# Patient Record
Sex: MALE | Race: WHITE | NOT HISPANIC OR LATINO | URBAN - METROPOLITAN AREA
[De-identification: names, ages, dates, MRNs, and addresses within clinical notes are randomized per-mention and may not be internally consistent; named-entity substitution may affect disease eponyms.]

---

## 2018-08-01 VITALS
WEIGHT: 242.07 LBS | TEMPERATURE: 100 F | RESPIRATION RATE: 18 BRPM | SYSTOLIC BLOOD PRESSURE: 124 MMHG | OXYGEN SATURATION: 97 % | HEART RATE: 100 BPM | DIASTOLIC BLOOD PRESSURE: 72 MMHG | HEIGHT: 66 IN

## 2018-08-01 LAB
ALBUMIN SERPL ELPH-MCNC: 4.7 G/DL — SIGNIFICANT CHANGE UP (ref 3.3–5)
ALP SERPL-CCNC: 67 U/L — SIGNIFICANT CHANGE UP (ref 40–120)
ALT FLD-CCNC: 37 U/L — SIGNIFICANT CHANGE UP (ref 10–45)
ANION GAP SERPL CALC-SCNC: 18 MMOL/L — HIGH (ref 5–17)
APTT BLD: 27.8 SEC — SIGNIFICANT CHANGE UP (ref 27.5–37.4)
AST SERPL-CCNC: 27 U/L — SIGNIFICANT CHANGE UP (ref 10–40)
BASE EXCESS BLDV CALC-SCNC: -1.2 MMOL/L — SIGNIFICANT CHANGE UP
BASOPHILS NFR BLD AUTO: 0.1 % — SIGNIFICANT CHANGE UP (ref 0–2)
BILIRUB SERPL-MCNC: 0.8 MG/DL — SIGNIFICANT CHANGE UP (ref 0.2–1.2)
BUN SERPL-MCNC: 20 MG/DL — SIGNIFICANT CHANGE UP (ref 7–23)
CA-I SERPL-SCNC: 1.19 MMOL/L — SIGNIFICANT CHANGE UP (ref 1.12–1.3)
CALCIUM SERPL-MCNC: 9.8 MG/DL — SIGNIFICANT CHANGE UP (ref 8.4–10.5)
CHLORIDE SERPL-SCNC: 103 MMOL/L — SIGNIFICANT CHANGE UP (ref 96–108)
CK MB CFR SERPL CALC: 6 NG/ML — SIGNIFICANT CHANGE UP (ref 0–6.7)
CK SERPL-CCNC: 170 U/L — SIGNIFICANT CHANGE UP (ref 30–200)
CO2 SERPL-SCNC: 21 MMOL/L — LOW (ref 22–31)
CREAT SERPL-MCNC: 1.07 MG/DL — SIGNIFICANT CHANGE UP (ref 0.5–1.3)
EOSINOPHIL NFR BLD AUTO: 0.4 % — SIGNIFICANT CHANGE UP (ref 0–6)
ETHANOL SERPL-MCNC: <10 MG/DL — SIGNIFICANT CHANGE UP (ref 0–10)
GAS PNL BLDV: 138 MMOL/L — SIGNIFICANT CHANGE UP (ref 138–146)
GAS PNL BLDV: SIGNIFICANT CHANGE UP
GAS PNL BLDV: SIGNIFICANT CHANGE UP
GLUCOSE SERPL-MCNC: 126 MG/DL — HIGH (ref 70–99)
HCO3 BLDV-SCNC: 23 MMOL/L — SIGNIFICANT CHANGE UP (ref 20–27)
HCT VFR BLD CALC: 43 % — SIGNIFICANT CHANGE UP (ref 39–50)
HGB BLD-MCNC: 14.9 G/DL — SIGNIFICANT CHANGE UP (ref 13–17)
INR BLD: 1.01 — SIGNIFICANT CHANGE UP (ref 0.88–1.16)
LACTATE SERPL-SCNC: 2.3 MMOL/L — HIGH (ref 0.5–2)
LYMPHOCYTES # BLD AUTO: 5 % — LOW (ref 13–44)
MCHC RBC-ENTMCNC: 31 PG — SIGNIFICANT CHANGE UP (ref 27–34)
MCHC RBC-ENTMCNC: 34.7 G/DL — SIGNIFICANT CHANGE UP (ref 32–36)
MCV RBC AUTO: 89.6 FL — SIGNIFICANT CHANGE UP (ref 80–100)
MONOCYTES NFR BLD AUTO: 6.6 % — SIGNIFICANT CHANGE UP (ref 2–14)
NEUTROPHILS NFR BLD AUTO: 87.9 % — HIGH (ref 43–77)
PCO2 BLDV: 39 MMHG — LOW (ref 41–51)
PH BLDV: 7.39 — SIGNIFICANT CHANGE UP (ref 7.32–7.43)
PLATELET # BLD AUTO: 236 K/UL — SIGNIFICANT CHANGE UP (ref 150–400)
PO2 BLDV: 42 MMHG — SIGNIFICANT CHANGE UP
POTASSIUM BLDV-SCNC: 4.2 MMOL/L — SIGNIFICANT CHANGE UP (ref 3.5–4.9)
POTASSIUM SERPL-MCNC: 4.4 MMOL/L — SIGNIFICANT CHANGE UP (ref 3.5–5.3)
POTASSIUM SERPL-SCNC: 4.4 MMOL/L — SIGNIFICANT CHANGE UP (ref 3.5–5.3)
PROT SERPL-MCNC: 7.2 G/DL — SIGNIFICANT CHANGE UP (ref 6–8.3)
PROTHROM AB SERPL-ACNC: 11.2 SEC — SIGNIFICANT CHANGE UP (ref 9.8–12.7)
RBC # BLD: 4.8 M/UL — SIGNIFICANT CHANGE UP (ref 4.2–5.8)
RBC # FLD: 12.3 % — SIGNIFICANT CHANGE UP (ref 10.3–16.9)
SAO2 % BLDV: 68 % — SIGNIFICANT CHANGE UP
SODIUM SERPL-SCNC: 142 MMOL/L — SIGNIFICANT CHANGE UP (ref 135–145)
TROPONIN T SERPL-MCNC: <0.01 NG/ML — SIGNIFICANT CHANGE UP (ref 0–0.01)
WBC # BLD: 22.7 K/UL — HIGH (ref 3.8–10.5)
WBC # FLD AUTO: 22.7 K/UL — HIGH (ref 3.8–10.5)

## 2018-08-01 PROCEDURE — 72147 MRI CHEST SPINE W/DYE: CPT | Mod: 26

## 2018-08-01 PROCEDURE — 72158 MRI LUMBAR SPINE W/O & W/DYE: CPT | Mod: 26

## 2018-08-01 RX ORDER — ACETAMINOPHEN 500 MG
650 TABLET ORAL ONCE
Qty: 0 | Refills: 0 | Status: COMPLETED | OUTPATIENT
Start: 2018-08-01 | End: 2018-08-01

## 2018-08-01 RX ORDER — HYDROMORPHONE HYDROCHLORIDE 2 MG/ML
1 INJECTION INTRAMUSCULAR; INTRAVENOUS; SUBCUTANEOUS ONCE
Qty: 0 | Refills: 0 | Status: DISCONTINUED | OUTPATIENT
Start: 2018-08-01 | End: 2018-08-01

## 2018-08-01 RX ORDER — SODIUM CHLORIDE 9 MG/ML
1000 INJECTION INTRAMUSCULAR; INTRAVENOUS; SUBCUTANEOUS ONCE
Qty: 0 | Refills: 0 | Status: COMPLETED | OUTPATIENT
Start: 2018-08-01 | End: 2018-08-01

## 2018-08-01 RX ORDER — PIPERACILLIN AND TAZOBACTAM 4; .5 G/20ML; G/20ML
3.38 INJECTION, POWDER, LYOPHILIZED, FOR SOLUTION INTRAVENOUS ONCE
Qty: 0 | Refills: 0 | Status: COMPLETED | OUTPATIENT
Start: 2018-08-01 | End: 2018-08-01

## 2018-08-01 RX ORDER — VANCOMYCIN HCL 1 G
1000 VIAL (EA) INTRAVENOUS ONCE
Qty: 0 | Refills: 0 | Status: COMPLETED | OUTPATIENT
Start: 2018-08-01 | End: 2018-08-01

## 2018-08-01 RX ADMIN — SODIUM CHLORIDE 1000 MILLILITER(S): 9 INJECTION INTRAMUSCULAR; INTRAVENOUS; SUBCUTANEOUS at 20:42

## 2018-08-01 RX ADMIN — Medication 250 MILLIGRAM(S): at 20:42

## 2018-08-01 RX ADMIN — Medication 650 MILLIGRAM(S): at 20:41

## 2018-08-01 RX ADMIN — PIPERACILLIN AND TAZOBACTAM 200 GRAM(S): 4; .5 INJECTION, POWDER, LYOPHILIZED, FOR SOLUTION INTRAVENOUS at 20:42

## 2018-08-01 RX ADMIN — HYDROMORPHONE HYDROCHLORIDE 1 MILLIGRAM(S): 2 INJECTION INTRAMUSCULAR; INTRAVENOUS; SUBCUTANEOUS at 20:42

## 2018-08-01 RX ADMIN — HYDROMORPHONE HYDROCHLORIDE 1 MILLIGRAM(S): 2 INJECTION INTRAMUSCULAR; INTRAVENOUS; SUBCUTANEOUS at 23:04

## 2018-08-01 RX ADMIN — PIPERACILLIN AND TAZOBACTAM 3.38 GRAM(S): 4; .5 INJECTION, POWDER, LYOPHILIZED, FOR SOLUTION INTRAVENOUS at 23:04

## 2018-08-01 NOTE — ED PROVIDER NOTE - OBJECTIVE STATEMENT
69 yo male with prior hx of ETOH abuse on naltrexone HLD HTN states he has had felt ill for 5-6 days - no recent travel - no sick contacts- dev fevers /chills last nite- no headache - no cough -no skin rashes  no tick bites - hasn't been [facility] or to the [facility] recently--states he was on the commode ? syncope ? head trauma developed right shoulder pain this related to possible syncopal event on tiolet  having mid to low back pain as well no incontinence of urine or stool no weakness of his legs or feet  c/o of pain with ambulation related to back pain- 67 yo male with prior hx of ETOH abuse on naltrexone HLD HTN states he has had felt ill for 5-6 days - no recent travel - no sick contacts- dev fevers /chills last nite- no headache or neck pain - severe low back pain lower to mid spine  - no cough -no skin rashes  no tick bites - hasn't been [facility] or to the [facility] recently--states he was on the commode ? syncope ? head trauma developed right shoulder pain this related to possible syncopal event on tiolet  having mid to low back pain as well no incontinence of urine or stool no weakness of his legs or feet  c/o of pain with ambulation related to back pain- 69 yo male with prior hx of ETOH abuse on naltrexone HLD HTN states he dev low back pain for 5-6 days - no recent travel - no sick contacts- dev fevers /chills last nite- no headache or neck pain - severe low back pain lower to mid spine  - no cough -no skin rashes  no tick bites - [place] or to the [facility] recently--states he was on the commode ? syncope ? head trauma developed right shoulder pain this related to possible syncopal event on tiolet  having mid to low back pain as well no incontinence of urine or stool no weakness of his legs or feet  c/o of pain with ambulation related to back pain-

## 2018-08-01 NOTE — ED ADULT TRIAGE NOTE - CHIEF COMPLAINT QUOTE
Patient c/o lower back pain radiating to both shoulders , left arm pain and left leg pain since last night , also had syncopal episode last night due to pain and dizziness . On aspirin daily . Denies any dizziness at this time .

## 2018-08-01 NOTE — ED ADULT NURSE NOTE - NSIMPLEMENTINTERV_GEN_ALL_ED
Implemented All Universal Safety Interventions:  [place] to call system. Call bell, personal items and telephone within reach. Instruct patient to call for assistance. Room bathroom lighting operational. Non-slip footwear when patient is off stretcher. Physically safe environment: no spills, clutter or unnecessary equipment. Stretcher in lowest position, wheels locked, appropriate side rails in place.

## 2018-08-01 NOTE — ED PROVIDER NOTE - SHIFT CHANGE DETAILS
if no epidural abscess on MR  then admit to cards for monitoring to eval for endocarditis in light of syncope

## 2018-08-01 NOTE — ED PROVIDER NOTE - CRANIAL NERVE AND PUPILLARY EXAM
corneal reflex intact/cranial nerves 2-12 intact/central and peripheral vision intact/gag reflex intact/tongue is midline/peripheral vision intact

## 2018-08-01 NOTE — ED PROVIDER NOTE - CARE PLAN
Principal Discharge DX:	Fever  Secondary Diagnosis:	Back pain Principal Discharge DX:	Fever  Secondary Diagnosis:	Back pain  Secondary Diagnosis:	Discitis

## 2018-08-01 NOTE — ED PROVIDER NOTE - MEDICAL DECISION MAKING DETAILS
69 yo male former drinker hx HTN HLD with fever  x 1 day 100.7 rectally  with low to mid back pain x 5 days  no trauma or falls no incontinence of urine or stool - no abd pain or flank pain no dysuria- c/o of right shoulder pain after fall off commode last nite WBC 22.7K  will MR LS and T spine to eval for SEA in light of fever neck supple on exam - clinical  ppt not c/w meningitis-  St on EKG  neg trop wuill require admission for iv antibiotics 69 yo male former drinker hx HTN HLD with fever  x 1 day 100.7 rectally  with low to mid back pain x 5 days  no trauma or falls no incontinence of urine or stool - no abd pain or flank pain no dysuria- c/o of right shoulder pain after fall off commode last nite WBC 22.7K  will MR LS and T spine to eval for SEA in light of fever neck supple on exam - clinical  ppt not c/w meningitis-  St on EKG  neg trop will require admission for cardiac monitoring ? endocarditis  dw  dr jhaveri

## 2018-08-02 ENCOUNTER — INPATIENT (INPATIENT)
Facility: HOSPITAL | Age: 68
LOS: 0 days | Discharge: HOME | DRG: 864 | End: 2018-08-03
Attending: INTERNAL MEDICINE | Admitting: INTERNAL MEDICINE
Payer: COMMERCIAL

## 2018-08-02 DIAGNOSIS — M46.47 DISCITIS, UNSPECIFIED, LUMBOSACRAL REGION: ICD-10-CM

## 2018-08-02 DIAGNOSIS — Z91.89 OTHER SPECIFIED PERSONAL RISK FACTORS, NOT ELSEWHERE CLASSIFIED: ICD-10-CM

## 2018-08-02 DIAGNOSIS — Z90.49 ACQUIRED ABSENCE OF OTHER SPECIFIED PARTS OF DIGESTIVE TRACT: Chronic | ICD-10-CM

## 2018-08-02 DIAGNOSIS — Z98.890 OTHER SPECIFIED POSTPROCEDURAL STATES: Chronic | ICD-10-CM

## 2018-08-02 DIAGNOSIS — Z90.89 ACQUIRED ABSENCE OF OTHER ORGANS: Chronic | ICD-10-CM

## 2018-08-02 DIAGNOSIS — Z29.9 ENCOUNTER FOR PROPHYLACTIC MEASURES, UNSPECIFIED: ICD-10-CM

## 2018-08-02 DIAGNOSIS — F10.10 ALCOHOL ABUSE, UNCOMPLICATED: ICD-10-CM

## 2018-08-02 DIAGNOSIS — I10 ESSENTIAL (PRIMARY) HYPERTENSION: ICD-10-CM

## 2018-08-02 DIAGNOSIS — A41.9 SEPSIS, UNSPECIFIED ORGANISM: ICD-10-CM

## 2018-08-02 LAB
ALBUMIN SERPL ELPH-MCNC: 4.3 G/DL — SIGNIFICANT CHANGE UP (ref 3.3–5)
ALP SERPL-CCNC: 58 U/L — SIGNIFICANT CHANGE UP (ref 40–120)
ALT FLD-CCNC: 30 U/L — SIGNIFICANT CHANGE UP (ref 10–45)
AMPHET UR-MCNC: NEGATIVE — SIGNIFICANT CHANGE UP
ANION GAP SERPL CALC-SCNC: 13 MMOL/L — SIGNIFICANT CHANGE UP (ref 5–17)
APPEARANCE UR: CLEAR — SIGNIFICANT CHANGE UP
AST SERPL-CCNC: 22 U/L — SIGNIFICANT CHANGE UP (ref 10–40)
BARBITURATES UR SCN-MCNC: NEGATIVE — SIGNIFICANT CHANGE UP
BASOPHILS NFR BLD AUTO: 0.1 % — SIGNIFICANT CHANGE UP (ref 0–2)
BENZODIAZ UR-MCNC: NEGATIVE — SIGNIFICANT CHANGE UP
BILIRUB SERPL-MCNC: 0.7 MG/DL — SIGNIFICANT CHANGE UP (ref 0.2–1.2)
BILIRUB UR-MCNC: NEGATIVE — SIGNIFICANT CHANGE UP
BUN SERPL-MCNC: 13 MG/DL — SIGNIFICANT CHANGE UP (ref 7–23)
CALCIUM SERPL-MCNC: 9.3 MG/DL — SIGNIFICANT CHANGE UP (ref 8.4–10.5)
CHLORIDE SERPL-SCNC: 99 MMOL/L — SIGNIFICANT CHANGE UP (ref 96–108)
CO2 SERPL-SCNC: 26 MMOL/L — SIGNIFICANT CHANGE UP (ref 22–31)
COCAINE METAB.OTHER UR-MCNC: NEGATIVE — SIGNIFICANT CHANGE UP
COLOR SPEC: YELLOW — SIGNIFICANT CHANGE UP
CREAT SERPL-MCNC: 1.15 MG/DL — SIGNIFICANT CHANGE UP (ref 0.5–1.3)
CRP SERPL-MCNC: 14.91 MG/DL — HIGH (ref 0–0.4)
DIFF PNL FLD: NEGATIVE — SIGNIFICANT CHANGE UP
EOSINOPHIL NFR BLD AUTO: 0.6 % — SIGNIFICANT CHANGE UP (ref 0–6)
ERYTHROCYTE [SEDIMENTATION RATE] IN BLOOD: 20 MM/HR — SIGNIFICANT CHANGE UP
GLUCOSE SERPL-MCNC: 127 MG/DL — HIGH (ref 70–99)
GLUCOSE UR QL: NEGATIVE — SIGNIFICANT CHANGE UP
HCT VFR BLD CALC: 39.9 % — SIGNIFICANT CHANGE UP (ref 39–50)
HGB BLD-MCNC: 13.2 G/DL — SIGNIFICANT CHANGE UP (ref 13–17)
KETONES UR-MCNC: NEGATIVE — SIGNIFICANT CHANGE UP
LEUKOCYTE ESTERASE UR-ACNC: NEGATIVE — SIGNIFICANT CHANGE UP
LYMPHOCYTES # BLD AUTO: 9.7 % — LOW (ref 13–44)
MCHC RBC-ENTMCNC: 30.8 PG — SIGNIFICANT CHANGE UP (ref 27–34)
MCHC RBC-ENTMCNC: 33.1 G/DL — SIGNIFICANT CHANGE UP (ref 32–36)
MCV RBC AUTO: 93.2 FL — SIGNIFICANT CHANGE UP (ref 80–100)
METHADONE UR-MCNC: NEGATIVE — SIGNIFICANT CHANGE UP
MONOCYTES NFR BLD AUTO: 10 % — SIGNIFICANT CHANGE UP (ref 2–14)
NEUTROPHILS NFR BLD AUTO: 79.6 % — HIGH (ref 43–77)
NITRITE UR-MCNC: NEGATIVE — SIGNIFICANT CHANGE UP
OPIATES UR-MCNC: NEGATIVE — SIGNIFICANT CHANGE UP
PCP SPEC-MCNC: SIGNIFICANT CHANGE UP
PCP UR-MCNC: NEGATIVE — SIGNIFICANT CHANGE UP
PH UR: 6 — SIGNIFICANT CHANGE UP (ref 5–8)
PLATELET # BLD AUTO: 211 K/UL — SIGNIFICANT CHANGE UP (ref 150–400)
POTASSIUM SERPL-MCNC: 4 MMOL/L — SIGNIFICANT CHANGE UP (ref 3.5–5.3)
POTASSIUM SERPL-SCNC: 4 MMOL/L — SIGNIFICANT CHANGE UP (ref 3.5–5.3)
PROT SERPL-MCNC: 6.8 G/DL — SIGNIFICANT CHANGE UP (ref 6–8.3)
PROT UR-MCNC: NEGATIVE MG/DL — SIGNIFICANT CHANGE UP
RBC # BLD: 4.28 M/UL — SIGNIFICANT CHANGE UP (ref 4.2–5.8)
RBC # FLD: 12.6 % — SIGNIFICANT CHANGE UP (ref 10.3–16.9)
SODIUM SERPL-SCNC: 138 MMOL/L — SIGNIFICANT CHANGE UP (ref 135–145)
SP GR SPEC: 1.01 — SIGNIFICANT CHANGE UP (ref 1–1.03)
THC UR QL: POSITIVE
UROBILINOGEN FLD QL: 0.2 E.U./DL — SIGNIFICANT CHANGE UP
WBC # BLD: 14 K/UL — HIGH (ref 3.8–10.5)
WBC # FLD AUTO: 14 K/UL — HIGH (ref 3.8–10.5)

## 2018-08-02 PROCEDURE — 99222 1ST HOSP IP/OBS MODERATE 55: CPT

## 2018-08-02 PROCEDURE — 70450 CT HEAD/BRAIN W/O DYE: CPT | Mod: 26

## 2018-08-02 PROCEDURE — 93306 TTE W/DOPPLER COMPLETE: CPT | Mod: 26

## 2018-08-02 PROCEDURE — 74174 CTA ABD&PLVS W/CONTRAST: CPT | Mod: 26

## 2018-08-02 PROCEDURE — 71045 X-RAY EXAM CHEST 1 VIEW: CPT | Mod: 26

## 2018-08-02 PROCEDURE — 93010 ELECTROCARDIOGRAM REPORT: CPT

## 2018-08-02 PROCEDURE — 72125 CT NECK SPINE W/O DYE: CPT | Mod: 26

## 2018-08-02 PROCEDURE — 71275 CT ANGIOGRAPHY CHEST: CPT | Mod: 26

## 2018-08-02 PROCEDURE — 99285 EMERGENCY DEPT VISIT HI MDM: CPT | Mod: 25

## 2018-08-02 PROCEDURE — 73030 X-RAY EXAM OF SHOULDER: CPT | Mod: 26,RT

## 2018-08-02 RX ORDER — LIDOCAINE 4 G/100G
2 CREAM TOPICAL ONCE
Qty: 0 | Refills: 0 | Status: COMPLETED | OUTPATIENT
Start: 2018-08-02 | End: 2018-08-02

## 2018-08-02 RX ORDER — ACETAMINOPHEN 500 MG
650 TABLET ORAL EVERY 6 HOURS
Qty: 0 | Refills: 0 | Status: DISCONTINUED | OUTPATIENT
Start: 2018-08-02 | End: 2018-08-02

## 2018-08-02 RX ORDER — LIDOCAINE 4 G/100G
1 CREAM TOPICAL DAILY
Qty: 0 | Refills: 0 | Status: DISCONTINUED | OUTPATIENT
Start: 2018-08-02 | End: 2018-08-03

## 2018-08-02 RX ORDER — VANCOMYCIN HCL 1 G
1500 VIAL (EA) INTRAVENOUS EVERY 12 HOURS
Qty: 0 | Refills: 0 | Status: DISCONTINUED | OUTPATIENT
Start: 2018-08-02 | End: 2018-08-02

## 2018-08-02 RX ORDER — HEPARIN SODIUM 5000 [USP'U]/ML
7500 INJECTION INTRAVENOUS; SUBCUTANEOUS EVERY 8 HOURS
Qty: 0 | Refills: 0 | Status: DISCONTINUED | OUTPATIENT
Start: 2018-08-02 | End: 2018-08-03

## 2018-08-02 RX ORDER — ONDANSETRON 8 MG/1
4 TABLET, FILM COATED ORAL ONCE
Qty: 0 | Refills: 0 | Status: COMPLETED | OUTPATIENT
Start: 2018-08-02 | End: 2018-08-02

## 2018-08-02 RX ORDER — MORPHINE SULFATE 50 MG/1
2 CAPSULE, EXTENDED RELEASE ORAL EVERY 4 HOURS
Qty: 0 | Refills: 0 | Status: DISCONTINUED | OUTPATIENT
Start: 2018-08-02 | End: 2018-08-03

## 2018-08-02 RX ORDER — CEFTRIAXONE 500 MG/1
2 INJECTION, POWDER, FOR SOLUTION INTRAMUSCULAR; INTRAVENOUS EVERY 24 HOURS
Qty: 0 | Refills: 0 | Status: DISCONTINUED | OUTPATIENT
Start: 2018-08-02 | End: 2018-08-02

## 2018-08-02 RX ORDER — HYDROMORPHONE HYDROCHLORIDE 2 MG/ML
1 INJECTION INTRAMUSCULAR; INTRAVENOUS; SUBCUTANEOUS ONCE
Qty: 0 | Refills: 0 | Status: DISCONTINUED | OUTPATIENT
Start: 2018-08-02 | End: 2018-08-02

## 2018-08-02 RX ORDER — ALPRAZOLAM 0.25 MG
1 TABLET ORAL ONCE
Qty: 0 | Refills: 0 | Status: DISCONTINUED | OUTPATIENT
Start: 2018-08-02 | End: 2018-08-02

## 2018-08-02 RX ORDER — ACETAMINOPHEN 500 MG
975 TABLET ORAL EVERY 6 HOURS
Qty: 0 | Refills: 0 | Status: DISCONTINUED | OUTPATIENT
Start: 2018-08-02 | End: 2018-08-03

## 2018-08-02 RX ORDER — HEPARIN SODIUM 5000 [USP'U]/ML
5000 INJECTION INTRAVENOUS; SUBCUTANEOUS EVERY 8 HOURS
Qty: 0 | Refills: 0 | Status: DISCONTINUED | OUTPATIENT
Start: 2018-08-02 | End: 2018-08-02

## 2018-08-02 RX ADMIN — ONDANSETRON 4 MILLIGRAM(S): 8 TABLET, FILM COATED ORAL at 01:21

## 2018-08-02 RX ADMIN — HYDROMORPHONE HYDROCHLORIDE 1 MILLIGRAM(S): 2 INJECTION INTRAMUSCULAR; INTRAVENOUS; SUBCUTANEOUS at 00:42

## 2018-08-02 RX ADMIN — LIDOCAINE 2 PATCH: 4 CREAM TOPICAL at 14:53

## 2018-08-02 RX ADMIN — CEFTRIAXONE 100 GRAM(S): 500 INJECTION, POWDER, FOR SOLUTION INTRAMUSCULAR; INTRAVENOUS at 06:40

## 2018-08-02 RX ADMIN — HYDROMORPHONE HYDROCHLORIDE 1 MILLIGRAM(S): 2 INJECTION INTRAMUSCULAR; INTRAVENOUS; SUBCUTANEOUS at 02:05

## 2018-08-02 RX ADMIN — HEPARIN SODIUM 7500 UNIT(S): 5000 INJECTION INTRAVENOUS; SUBCUTANEOUS at 22:39

## 2018-08-02 RX ADMIN — HYDROMORPHONE HYDROCHLORIDE 1 MILLIGRAM(S): 2 INJECTION INTRAMUSCULAR; INTRAVENOUS; SUBCUTANEOUS at 05:22

## 2018-08-02 RX ADMIN — LIDOCAINE 1 PATCH: 4 CREAM TOPICAL at 22:00

## 2018-08-02 RX ADMIN — HEPARIN SODIUM 7500 UNIT(S): 5000 INJECTION INTRAVENOUS; SUBCUTANEOUS at 14:51

## 2018-08-02 RX ADMIN — SODIUM CHLORIDE 1000 MILLILITER(S): 9 INJECTION INTRAMUSCULAR; INTRAVENOUS; SUBCUTANEOUS at 01:22

## 2018-08-02 RX ADMIN — Medication 975 MILLIGRAM(S): at 09:45

## 2018-08-02 RX ADMIN — Medication 975 MILLIGRAM(S): at 16:54

## 2018-08-02 RX ADMIN — Medication 300 MILLIGRAM(S): at 06:41

## 2018-08-02 RX ADMIN — MORPHINE SULFATE 2 MILLIGRAM(S): 50 CAPSULE, EXTENDED RELEASE ORAL at 19:44

## 2018-08-02 RX ADMIN — LIDOCAINE 1 PATCH: 4 CREAM TOPICAL at 10:22

## 2018-08-02 RX ADMIN — Medication 975 MILLIGRAM(S): at 18:11

## 2018-08-02 RX ADMIN — Medication 1000 MILLIGRAM(S): at 01:22

## 2018-08-02 RX ADMIN — Medication 50 MILLIGRAM(S): at 10:15

## 2018-08-02 RX ADMIN — Medication 1 MILLIGRAM(S): at 02:16

## 2018-08-02 RX ADMIN — LIDOCAINE 2 PATCH: 4 CREAM TOPICAL at 02:14

## 2018-08-02 RX ADMIN — Medication 975 MILLIGRAM(S): at 10:45

## 2018-08-02 RX ADMIN — Medication 50 MILLIGRAM(S): at 16:53

## 2018-08-02 NOTE — CONSULT NOTE ADULT - PROBLEM SELECTOR RECOMMENDATION 9
Biopsy by radiology  No antibiotics until after biopsy done  I have called radiology to discuss   Await return phone call

## 2018-08-02 NOTE — H&P ADULT - NSHPLABSRESULTS_GEN_ALL_CORE
.  LABS:                         14.9   22.7  )-----------( 236      ( 01 Aug 2018 19:37 )             43.0     08-01    142  |  103  |  20  ----------------------------<  126<H>  4.4   |  21<L>  |  1.07    Ca    9.8      01 Aug 2018 19:37    TPro  7.2  /  Alb  4.7  /  TBili  0.8  /  DBili  x   /  AST  27  /  ALT  37  /  AlkPhos  67  08-01    PT/INR - ( 01 Aug 2018 19:37 )   PT: 11.2 sec;   INR: 1.01          PTT - ( 01 Aug 2018 19:37 )  PTT:27.8 sec    CARDIAC MARKERS ( 01 Aug 2018 19:37 )  x     / <0.01 ng/mL / 170 U/L / x     / 6.0 ng/mL    Lactate, Blood: 2.3 mmoL/L (08-01 @ 19:37)    RADIOLOGY, EKG & ADDITIONAL TESTS: Reviewed.   CTH Noncon 8/2:  IMPRESSION:  No acute intracranial findings.  No evidence of cervical fracture or malalignment.  CT Angio chest/ abd/pelc 8/2:  No aortic dissection. Aortic branches are patent the main pulmonary   artery is dilated up to 3.7 cm consistent with some degree of pulmonary   arterial hypertension. Hepatic steatosis. Gallbladder, spleen, pancreas,   right adrenal gland and bilateral kidneys are unremarkable. Nonspecific   nodular thickening of the left adrenal gland. Urinary bladder grossly   unremarkable. No bowel obstruction, pericolic inflammatory change,   intra-abdominal ascites or pneumoperitoneum. Bibasilar atelectasis. No   consolidation, pleural effusion or pneumothorax. No mediastinal, axillary   or retroperitoneal adenopathy. Small right and moderate left   fat-containing inguinal hernia. Degenerative changes throughout the   spine.   MRI Lumbar/ thoracic 8/2:  1. 12 x 13 mm areas of enhancement seen within the superior L3 endplate   with small areas of  enhancement in superior and inferior L3, inferior L2 and superior L4   endplates. Osteomyelitis with  discitis should be considered in setting of workup for fever.  2. Areas of abnormal enhancement seen adjacent to L3 spinous process as   described above  measuring up to 2.5 x 2.0 cm likely representing myositis and cellulitis.

## 2018-08-02 NOTE — H&P ADULT - ASSESSMENT
A/p    67 yo M presenting to severe low back pain also one episode of syncope.  PMH of alcohol abuse in the past and HTN has chronic back pain but not at that extent. A/p    67 yo M presenting to severe low back pain also one episode of syncope.  PMH of alcohol abuse in the past/HTN/ hep C? Treatment has chronic back pain but not at that extent.

## 2018-08-02 NOTE — H&P ADULT - PROBLEM SELECTOR PLAN 2
pt reports one episode of syncope and LOC 2 night ago  denies any CP/SOB/ DIAL/ Palpitation/ no Hx of cardiac conditions/ no hx of seizure disorders  ECG shows sinus tachycardia with mild LAD otherwise unremarkable.  given episode happened when he was using the bathroom points toward a vasovagal event however needs further work up for syncope.  - Monitor for now  - repeat ECG

## 2018-08-02 NOTE — CONSULT NOTE ADULT - SUBJECTIVE AND OBJECTIVE BOX
HISTORY OF PRESENT ILLNESS:   67 y/o male with PMH HTN, HLD, depression, alcoholism presents to ED for low back pain x3 days. The pain was in the morning and got better throughout the day. Yesterday low back pain developed into generalized, whole body aches and pains in his whole back, shoulders and joints. His back pain radiates up his pain to his neck. He also endorses worsening neck pain with lateral movement of his neck. Patient also states he had a syncopal episode while in the bathroom 2 nights ago associated with subjective fever, chills and sweats x1 night. Patient denies weakness, trauma, incontinence, CP, SOB, N/V, IV drug use.     PAST MEDICAL & SURGICAL HISTORY:  Hepatitis C: s/p Treatment in the   Alcohol abuse  Hypertension  H/O arthroscopic knee surgery: Left meniscal tear  S/P appendectomy  S/P tonsillectomy    FAMILY HISTORY:  Family history of colon cancer (Father, Mother, Sibling)      SOCIAL HISTORY:  Tobacco Use:  EtOH use:   Substance:    Allergies    No Known Allergies    Intolerances        REVIEW OF SYSTEMS  AS mentioned in HPI    MEDICATIONS:  Antibiotics:    Neuro:  acetaminophen   Tablet. 975 milliGRAM(s) Oral every 6 hours PRN  pregabalin 50 milliGRAM(s) Oral three times a day    Anticoagulation:  heparin  Injectable 7500 Unit(s) SubCutaneous every 8 hours    OTHER:  lidocaine   Patch 1 Patch Transdermal daily    IVF:      Vital Signs Last 24 Hrs  T(C): 36.8 (02 Aug 2018 16:05), Max: 38.2 (01 Aug 2018 20:47)  T(F): 98.2 (02 Aug 2018 16:05), Max: 100.7 (01 Aug 2018 20:47)  HR: 88 (02 Aug 2018 16:05) (73 - 112)  BP: 129/73 (02 Aug 2018 16:05) (124/72 - 162/93)  BP(mean): --  RR: 18 (02 Aug 2018 16:05) (16 - 29)  SpO2: 95% (02 Aug 2018 16:05) (93% - 97%)    PHYSICAL EXAM:  Constitutional: seated in chair near hospital bed, NAD  Neuro: AA+Ox3, open eyes spontaneously following commands  CN II-XII grossly intact: PERRL, EOMI, face symmetric  MAEx4, RUE 3/5 to shoulder flexion, 5/5 to hand . Otherwise 5/5 strength  Respiratory: clear to auscultation  Cardiovascular: regular rate and rhythm  Gastrointestinal: soft, nontender, nondistended      LABS:                        13.2   14.0  )-----------( 211      ( 02 Aug 2018 10:50 )             39.9         138  |  99  |  13  ----------------------------<  127<H>  4.0   |  26  |  1.15    Ca    9.3      02 Aug 2018 10:50    TPro  6.8  /  Alb  4.3  /  TBili  0.7  /  DBili  x   /  AST  22  /  ALT  30  /  AlkPhos  58      PT/INR - ( 01 Aug 2018 19:37 )   PT: 11.2 sec;   INR: 1.01          PTT - ( 01 Aug 2018 19:37 )  PTT:27.8 sec  Urinalysis Basic - ( 02 Aug 2018 09:53 )    Color: Yellow / Appearance: Clear / S.010 / pH: x  Gluc: x / Ketone: NEGATIVE  / Bili: Negative / Urobili: 0.2 E.U./dL   Blood: x / Protein: NEGATIVE mg/dL / Nitrite: NEGATIVE   Leuk Esterase: NEGATIVE / RBC: x / WBC x   Sq Epi: x / Non Sq Epi: x / Bacteria: x      CULTURES:  Culture Results:   No growth at 12 hours ( @ 21:29)  Culture Results:   No growth at 12 hours ( @ 21:29)      RADIOLOGY & ADDITIONAL STUDIES:  MRI thoracic spine :  IMPRESSION: No evidence of pathology.    MRI Lumbar spine :   IMPRESSION:  1. 12 x 13 mm areas of enhancement seen within the superior L3 endplate   with small areas of enhancement in superior and inferior L3, inferior L2   and superior L4 endplates. Osteomyelitis with discitis should be   considered in setting of workup for fever.  2. Areas of abnormal enhancement seen adjacent to L3 spinous process as   described above measuring up to 2.5 x 2.0 cm likely representing myositis   and cellulitis.    Assessment:  67 y/o male with PMH HTN, HLD, depression, alcoholism presents to ED for low back pain x3 days.    Plan:  - Will review imaging with Dr. Choudhury

## 2018-08-02 NOTE — H&P ADULT - FAMILY HISTORY
No pertinent family history Father  Still living? Unknown  Family history of colon cancer, Age at diagnosis: Age Unknown     Mother  Still living? Unknown  Family history of colon cancer, Age at diagnosis: Age Unknown     Sibling  Still living? Unknown  Family history of colon cancer, Age at diagnosis: Age Unknown

## 2018-08-02 NOTE — H&P ADULT - PROBLEM SELECTOR PLAN 1
pt meeting criteria for severe sepsis, given new onset of severe back pain, clinical picture concerning for discitis vs OM/ Lumbar MRI also showing 12x13 mm of enhancement at the level of L3/ pt started on vancomycin and zosyn in the ED, ortho saw th pt in the ED with no recs regarding surgical intervention at this point.  ESR 10/ CRP at 5.8  - continue vancomycin 15mg/kg every 12 hours for now  - switch zosyn to ceftriaxone  - f/u BC and tailor ABx accordingly  - f/u ortho recs

## 2018-08-02 NOTE — CONSULT NOTE ADULT - SUBJECTIVE AND OBJECTIVE BOX
Patient is a 68y old  Male who presents with a chief complaint of Severe back pain and sepsis (02 Aug 2018 06:35)        HPI:  HPI:  5o yo M, presenting to ED for generalized malaise and severe low back pain started about 4-5 days ago.  abby pmh is notable for alcohol abuse on naltrexone currently and HTN.  he reports one episode of syncope on 7/31 when he was using the bathroom and following that event he has been having generalized body pain and severe low/mid back pain also is complaining of severe right shoulder pain, however denies direct trauma, pain is worse when he moves in the bad and wants to change position however gets better once he starts ambulating/ he reports some pain radiation to the legs worse on the left/he also reports low grade at home/ denies chills/ cough/ cp/sob/ palpitation/ abdominal pain/ diarrhea or urinary symptoms, reports some neck pain mostly when he moves to the sides, able to perform chin to chest/ reports some photophobia, denies HA or blurry vision.  in the ED he was found to have a rectal temp of 100.7 with WBC count of 22 with left shift and elevated lactate of 2.3  Lumbar MRI showing small area of enhancement at the level of L3, raising concern for discitis/ OM. (02 Aug 2018 06:35)     Back pain and radicular symptoms in the LE      Allergies  No Known Allergies      Health Issues  FEVER BACK PAIN  Family history of colon cancer (Father, Mother, Sibling)  No pertinent family history  Handoff  MEWS Score  Hepatitis C  Alcohol abuse  Hypertension  Fever  Transition of care performed with sharing of clinical summary  Prophylactic measure  Alcohol abuse  Hypertension  Sepsis  H/O arthroscopic knee surgery  S/P appendectomy  S/P tonsillectomy  BACK PAIN  Syncope  90+  Discitis  Back pain        FAMILY HISTORY:  Family history of colon cancer (Father, Mother, Sibling)      MEDICATIONS  (STANDING):  acetaminophen   Tablet 650 milliGRAM(s) Oral every 6 hours  cefTRIAXone   IVPB 2 Gram(s) IV Intermittent every 24 hours  heparin  Injectable 5000 Unit(s) SubCutaneous every 8 hours  vancomycin  IVPB 1500 milliGRAM(s) IV Intermittent every 12 hours    MEDICATIONS  (PRN):      PAST MEDICAL & SURGICAL HISTORY:  Hepatitis C: s/p Treatment in the 90s  Alcohol abuse  Hypertension  H/O arthroscopic knee surgery: Left meniscal tear  S/P appendectomy  S/P tonsillectomy      Labs                          14.9   22.7  )-----------( 236      ( 01 Aug 2018 19:37 )             43.0     08-01    142  |  103  |  20  ----------------------------<  126<H>  4.4   |  21<L>  |  1.07    Ca    9.8      01 Aug 2018 19:37    TPro  7.2  /  Alb  4.7  /  TBili  0.8  /  DBili  x   /  AST  27  /  ALT  37  /  AlkPhos  67  08-01      Radiology:    Physical Exam    MENTAL STATUS  -Level of Consciousness- awake    Orientation- person, place time  Language- aphasia/ dysarthria- nl  Memory- recent and remote- nl      Cranial Nerve 1- 12  Pupils- equal and reactive  Eye movements- full  Facial - no asymmetry   Lower CN-nl    Gait and Station-n/a    MOTOR  Upper-nl  Lower- no foot drop    Reflexes- decreased    Sensation- no sensory level    Cerebellar- no tremors    vascular -no bruits    Assessment- R/O Lumbar osteomyelitis    Plan as per NS and ID

## 2018-08-02 NOTE — CONSULT NOTE ADULT - SUBJECTIVE AND OBJECTIVE BOX
HPI:  HPI:  5o yo M, presenting to ED for generalized malaise and severe low back pain started about 4-5 days ago.  abby pmh is notable for alcohol abuse on naltrexone currently and HTN.  he reports one episode of syncope on  when he was using the bathroom and following that event he has been having generalized body pain and severe low/mid back pain also is complaining of severe right shoulder pain, however denies direct trauma, pain is worse when he moves in the bad and wants to change position however gets better once he starts ambulating/ he reports some pain radiation to the legs worse on the left/he also reports low grade at home/ denies chills/ cough/ cp/sob/ palpitation/ abdominal pain/ diarrhea or urinary symptoms, reports some neck pain mostly when he moves to the sides, able to perform chin to chest/ reports some photophobia, denies HA or blurry vision.  in the ED he was found to have a rectal temp of 100.7 with WBC count of 22 with left shift and elevated lactate of 2.3  Lumbar MRI showing small area of enhancement at the level of L3, raising concern for discitis/ OM. (02 Aug 2018 06:35)      PAST MEDICAL & SURGICAL HISTORY:  Hepatitis C: s/p Treatment in the   Alcohol abuse  Hypertension  H/O arthroscopic knee surgery: Left meniscal tear  S/P appendectomy  S/P tonsillectomy      REVIEW OF SYSTEMS:    Constitutional: No fever, weight loss or fatigue  Eyes: No eye pain, visual disturbances, or discharge  ENMT:  No difficulty hearing, tinnitus, vertigo; No sinus or throat pain  Neck: No pain or stiffness  Respiratory: No cough, wheezing, chills or hemoptysis  Cardiovascular: No chest pain, palpitations, shortness of breath, dizziness or leg swelling  Gastrointestinal: No abdominal or epigastric pain. No nausea, vomiting or hematemesis; No diarrhea or constipation. No melena or hematochezia.  Genitourinary: No dysuria, frequency, hematuria or incontinence  Neurological: No headaches, memory loss, loss of strength, numbness or tremors  Skin: No itching, burning, rashes or lesions   Lymph Nodes: No enlarged glands  Endocrine: No heat or cold intolerance; No hair loss  Musculoskeletal: low back pain  Heme/Lymph: No easy bruising or bleeding gums  Allergy and Immunologic: No hives or eczema    MEDICATIONS  (STANDING):  heparin  Injectable 7500 Unit(s) SubCutaneous every 8 hours  lidocaine   Patch 1 Patch Transdermal daily  pregabalin 50 milliGRAM(s) Oral three times a day    MEDICATIONS  (PRN):  acetaminophen   Tablet. 975 milliGRAM(s) Oral every 6 hours PRN Moderate Pain (4 - 6)          Allergies    No Known Allergies    Intolerances        SOCIAL HISTORY:    FAMILY HISTORY:  Family history of colon cancer (Father, Mother, Sibling)      Vital Signs Last 24 Hrs  T(C): 37.1 (02 Aug 2018 09:22), Max: 38.2 (01 Aug 2018 20:47)  T(F): 98.7 (02 Aug 2018 09:22), Max: 100.7 (01 Aug 2018 20:47)  HR: 98 (02 Aug 2018 09:22) (94 - 112)  BP: 143/86 (02 Aug 2018 09:22) (124/72 - 162/93)  BP(mean): --  RR: 18 (02 Aug 2018 09:22) (18 - 29)  SpO2: 93% (02 Aug 2018 09:22) (93% - 97%)    PHYSICAL EXAM:    General: Well developed; well nourished; in no acute distress  Eyes: PERRL, EOM intact; conjunctiva and sclera clear  Head: Normocephalic; atraumatic  ENMT: No nasal discharge; airway clear  Neck: Supple; non tender; no masses  Respiratory: No wheezes, rales or rhonchi  Cardiovascular: Regular rate and rhythm. S1 and S2 Normal; No murmurs, gallops or rubs  Gastrointestinal: Soft non-tender non-distended; Normal bowel sounds; No hepatosplenomegaly  Genitourinary: No costovertebral angle tenderness  Extremities: Normal range of motion, No clubbing, cyanosis or edema  Vascular: Peripheral pulses palpable 2+ bilaterally  Neurological: Alert and oriented x3  Skin: Warm and dry. No acute rash  Lymph Nodes: No acute cervical adenopathy  Musculoskeletal: tenderness over lower back    LABS:                        13.2   14.0  )-----------( 211      ( 02 Aug 2018 10:50 )             39.9         138  |  99  |  13  ----------------------------<  127<H>  4.0   |  26  |  1.15    Ca    9.3      02 Aug 2018 10:50    TPro  6.8  /  Alb  4.3  /  TBili  0.7  /  DBili  x   /  AST  22  /  ALT  30  /  AlkPhos  58  08-    PT/INR - ( 01 Aug 2018 19:37 )   PT: 11.2 sec;   INR: 1.01          PTT - ( 01 Aug 2018 19:37 )  PTT:27.8 sec  Urinalysis Basic - ( 02 Aug 2018 09:53 )    Color: Yellow / Appearance: Clear / S.010 / pH: x  Gluc: x / Ketone: NEGATIVE  / Bili: Negative / Urobili: 0.2 E.U./dL   Blood: x / Protein: NEGATIVE mg/dL / Nitrite: NEGATIVE   Leuk Esterase: NEGATIVE / RBC: x / WBC x   Sq Epi: x / Non Sq Epi: x / Bacteria: x      Culture Results:   No growth at 12 hours ( @ 21:29)  Culture Results:   No growth at 12 hours ( @ 21:29)    RADIOLOGY & ADDITIONAL STUDIES:

## 2018-08-02 NOTE — CONSULT NOTE ADULT - SUBJECTIVE AND OBJECTIVE BOX
REASON FOR CONSULT:    HISTORY OF PRESENT ILLNESS:  5o yo M, presenting to ED for generalized malaise and severe low back pain started about 4-5 days ago.  abby pmh is notable for alcohol abuse on naltrexone currently and HTN.  he reports one episode of syncope on 7/31 when he was using the bathroom and following that event he has been having generalized body pain and severe low/mid back pain also is complaining of severe right shoulder pain, however denies direct trauma, pain is worse when he moves in the bad and wants to change position however gets better once he starts ambulating/ he reports some pain radiation to the legs worse on the left/he also reports low grade at home/ denies chills/ cough/ cp/sob/ palpitation/ abdominal pain/ diarrhea or urinary symptoms, reports some neck pain mostly when he moves to the sides, able to perform chin to chest/ reports some photophobia, denies HA or blurry vision.  in the ED he was found to have a rectal temp of 100.7 with WBC count of 22 with left shift and elevated lactate of 2.3  Lumbar MRI showing small area of enhancement at the level of L3, raising concern for discitis/ OM.    PAST MEDICAL & SURGICAL HISTORY:  Hepatitis C: s/p Treatment in the 90s  Alcohol abuse  Hypertension  H/O arthroscopic knee surgery: Left meniscal tear  S/P appendectomy  S/P tonsillectomy      [ ] Diabetes   [ ] Hypertension  [ ] Hyperlipidemia  [ ] CAD  [ ] PCI  [ ] CABG    PREVIOUS DIAGNOSTIC TESTING:    [ ] Echocardiogram:  [ ]  Catheterization:  [ ] Stress Test:  	    MEDICATIONS:        acetaminophen   Tablet. 975 milliGRAM(s) Oral every 6 hours PRN  pregabalin 50 milliGRAM(s) Oral three times a day        heparin  Injectable 7500 Unit(s) SubCutaneous every 8 hours  lidocaine   Patch 1 Patch Transdermal daily      FAMILY HISTORY:  Family history of colon cancer (Father, Mother, Sibling)      SOCIAL HISTORY:    [ ] Non-smoker  [ ] Smoker  [ ] Alcohol    Allergies    No Known Allergies    Intolerances    	    REVIEW OF SYSTEMS:    [x] as per HPI  CONSTITUTIONAL: No fever, weight loss, or fatigue  ENT:  No difficulty hearing, tinnitus, vertigo; No sinus or throat pain  RESPIRATORY: No cough, wheezing, chills or hemoptysis; No Shortness of Breath  CARDIOVASCULAR: No chest pain, palpitations, dizziness, or leg swelling  GASTROINTESTINAL: No abdominal or epigastric pain. No nausea, vomiting, or hematemesis; No diarrhea or constipation. No melena or hematochezia.  GENITOURINARY: No dysuria, frequency, hematuria, or incontinence  NEUROLOGICAL: No headaches, memory loss, loss of strength, numbness, or tremors  MUSCULOSKELETAL: No joint pain or swelling; No muscle, back, or extremity pain  [x] All others negative	  [ ] Unable to obtain    PHYSICAL EXAM:  T(C): 36.9 (08-02-18 @ 13:13), Max: 38.2 (08-01-18 @ 20:47)  HR: 73 (08-02-18 @ 13:13) (73 - 112)  BP: 137/70 (08-02-18 @ 13:13) (124/72 - 162/93)  RR: 16 (08-02-18 @ 13:13) (16 - 29)  SpO2: 95% (08-02-18 @ 13:13) (93% - 97%)  Wt(kg): --  I&O's Summary      Appearance: Normal	  HEENT:   Normal oral mucosa, PERRL, EOMI	  Lymphatic: No lymphadenopathy  Cardiovascular: Normal S1 S2, No JVD, No murmurs, No edema  Respiratory: Lungs clear to auscultation	  Psychiatry: A & O x 3, Mood & affect appropriate  Gastrointestinal:  Soft, Non-tender, + BS	  Skin: No rashes, No ecchymoses, No cyanosis	  Neurologic: Non-focal  Extremities: Normal range of motion, No clubbing, cyanosis or edema  Vascular: Peripheral pulses palpable 2+ bilaterally    TELEMETRY: 	    ECG:   < from: 12 Lead ECG (08.01.18 @ 19:01) >  Diagnosis Line Normal sinus rhythm  Minimal voltage criteria for LVH, may be normal variant  Prolonged QT    < end of copied text >    ECHO:   STRESS:  CATH:  	  RADIOLOGY:  CXR: < from: CT Angio Chest w/ IV Cont (08.02.18 @ 03:45) >    IMPRESSION:  No acute findings.    < end of copied text >    CT:  US:   	  	  LABS:	 	    CARDIAC MARKERS:                                  13.2   14.0  )-----------( 211      ( 02 Aug 2018 10:50 )             39.9     08-02    138  |  99  |  13  ----------------------------<  127<H>  4.0   |  26  |  1.15    Ca    9.3      02 Aug 2018 10:50    TPro  6.8  /  Alb  4.3  /  TBili  0.7  /  DBili  x   /  AST  22  /  ALT  30  /  AlkPhos  58  08-02    proBNP:   Lipid Profile:   HgA1c:   TSH:     ASSESSMENT/PLAN: 	    #Syncope -   yncope.  Plan: pt reports one episode of syncope and LOC 2 night ago  denies any CP/SOB/ DIAL/ Palpitation/ no Hx of cardiac conditions/ no hx of seizure disorders  ECG shows sinus tachycardia with mild LAD otherwise unremarkable.  given episode happened when he was using the bathroom points toward a vasovagal event however needs further work up for syncope.  Recommend:  check orthostatics if possible  check 2D echo and carotid dopplers  Hold AVN blockers    #CAD - no sis/s ischemia on ecg  no s/s acute decompensation  ct chest clear  no murmurs on exam    #HTN - pain control    #CV Prevention  q3mo Fasting Lipid Profile, Goal LDL<100, statin as tolerated  q6week TSH  q3mo 25-OH Vitamin D Level, Goal 50, supplement as tolerated

## 2018-08-02 NOTE — H&P ADULT - NSHPPHYSICALEXAM_GEN_ALL_CORE
VITAL SIGNS:  T(C): 37.3 (08-02-18 @ 04:31), Max: 38.2 (08-01-18 @ 20:47)  T(F): 99.2 (08-02-18 @ 04:31), Max: 100.7 (08-01-18 @ 20:47)  HR: 94 (08-02-18 @ 04:31) (94 - 112)  BP: 162/93 (08-02-18 @ 04:31) (124/72 - 162/93)  RR: 20 (08-02-18 @ 04:31) (18 - 29)  SpO2: 96% (08-02-18 @ 04:31) (96% - 97%)    PHYSICAL EXAM:  Constitutional: in moderated distress du to back pain with minimal movement   Head: NC/AT  Eyes: PERRL, EOMI  ENT: MMM  Neck: supple; no JVD   Respiratory: CTA B/L  Cardiac: +S1/S2; RRR; ? systolic murmur at the aortic valve area  Gastrointestinal: obese, soft, NT/ND; no rebound or guarding; BS +  Back: spine midline, no bony tenderness or step-offs; no CVAT B/L/ pt with severe lower and middle back pain mostly on the right side, worsening with movement  unable to examine SLR  Extremities: WWP, no peripheral edema, intact dp and radial pulses BL  Musculoskeletal: right shoulder pain, ROM limited in the setting of pain, no swlling or effusion noticed   Lymphatic: no submandibular or cervical LAD  Neurologic: AAOx3; CNII-XII grossly intact; no focal deficits/ SILT throughout  Psychiatric: mood and affect appropriate    T/L/D: PIV c/d/i VITAL SIGNS:  T(C): 37.3 (08-02-18 @ 04:31), Max: 38.2 (08-01-18 @ 20:47)  T(F): 99.2 (08-02-18 @ 04:31), Max: 100.7 (08-01-18 @ 20:47)  HR: 94 (08-02-18 @ 04:31) (94 - 112)  BP: 162/93 (08-02-18 @ 04:31) (124/72 - 162/93)  RR: 20 (08-02-18 @ 04:31) (18 - 29)  SpO2: 96% (08-02-18 @ 04:31) (96% - 97%)    PHYSICAL EXAM:  Constitutional: in moderated distress du to back pain with minimal movement   Head: NC/AT  Eyes: PERRL, EOMI  ENT: MMM  Neck: supple; no JVD   Respiratory: CTA B/L  Cardiac: +S1/S2; RRR; ? systolic murmur at the aortic valve area  Gastrointestinal: obese, soft, NT/ND; no rebound or guarding; BS +  Back: spine midline, no bony tenderness or step-offs; no CVAT B/L/ pt with severe lower and middle back pain mostly on the right side, worsening with movement, no tenderness to touch or palpation, no fluctuance or collections noticed on exam.  unable to examine SLR  Extremities: WWP, no peripheral edema, intact dp and radial pulses BL  Musculoskeletal: right shoulder pain, ROM limited in the setting of pain, no swlling or effusion noticed   Lymphatic: no submandibular or cervical LAD  Neurologic: AAOx3; CNII-XII grossly intact; no focal deficits/ SILT throughout  Psychiatric: mood and affect appropriate    T/L/D: PIV c/d/i

## 2018-08-02 NOTE — H&P ADULT - PROBLEM SELECTOR PLAN 6
1) PCP Contacted on Admission: (N) --> Name & Phone #  Dr. Estiven Adams   2) Date of Contact with PCP:  3) PCP Contacted at Discharge: (Y/N)  4) Summary of Handoff Given to PCP:   5) Post-Discharge Appointment Date and Location:

## 2018-08-02 NOTE — CONSULT NOTE ADULT - SUBJECTIVE AND OBJECTIVE BOX
Orthopaedic Consult Note    Consult Requested by: ED  Surgeon: Luisito    CC:Patient is a 68y old  Male who presents with a chief complaint of back pain s/p unwitnessed syncope    HPI  68yMale  c/o generalized aches and pain. 4 days ago he began feeling a small pain in his mid back which eventually went away. On 7/31 pt was using restroom and syncopized, since then he has been having generalized muscle/joint aches, the worst of which are in the mid back laterally and radiate toward the midline. He endorses shoulder and knee pain as well as some SOB and chest pain. Pain is worse with movement and when sitting up he feels pain radiating anteriorly on the b/l flanks. He does not complain of fever but had a low grade fever rectally of 100.7 in ER. Denies weakness, paresthesias, leg pain, numbness, tingling, recent travel or illness, no sick contacts.     PAST MEDICAL & SURGICAL HISTORY:    Allergies    No Known Allergies    Intolerances      MEDICATIONS  (STANDING):  ondansetron Injectable 4 milliGRAM(s) IV Push once      Vital Signs Last 24 Hrs  T(C): 37.2 (02 Aug 2018 01:03), Max: 38.2 (01 Aug 2018 20:47)  T(F): 98.9 (02 Aug 2018 01:03), Max: 100.7 (01 Aug 2018 20:47)  HR: 101 (02 Aug 2018 01:03) (100 - 112)  BP: 131/79 (02 Aug 2018 01:03) (124/72 - 161/80)  BP(mean): --  RR: 29 (02 Aug 2018 01:03) (18 - 29)  SpO2: 97% (02 Aug 2018 01:03) (97% - 97%)    Physical Exam:  General: NAD, alert & oriented x 3  R+ L LE    Motor: 5/5 GS/TA/EHL/FHL    Sensation: SILT s/sp/dp/tib  Pulses:  DP/PT 2+ , toes/foot WWP    R + L UE    Motor: 5/5 wrist flexion, extension/, palmar intrinsics/bicep/tricep/delt  Sensation: SILT med/uln/rad/musc/axillary   Pulses:  rad/brach 2+ , fingers/hand WWP    Spine:       Inspection: Neck and spine have no noted deformities or signs of inflammation. Curvature of cervical, thoracic, and lumbar spine are WNL. Bony features of shoulders and hips are      of equal height bilaterally. Posture is upright         Palpation: Spinous processes of C7-L5 palpable, midline, non-tender; no step-offs.          ROM: Flexion, extension, lateral bending and rotation of cervical spine wnl without discomfort, however movement of lumbar spine causes pain, specifically in the anterior flanks. Spurling maneuver negative. Straight leg test negatvie bilaterally         Reflexes: Downgoing toes bilaterally.         Motor: Good muscle bulk & tone. 5/5 deltoid, biceps, triceps, wrist flex/ext, hand , quads, hamstrings, ankle plantar/dorsiflexion.   Intact motor in C5-T1 / L1-S1 distributions.         Sensation: SILT throughout                              14.9   22.7  )-----------( 236      ( 01 Aug 2018 19:37 )             43.0     08-01    142  |  103  |  20  ----------------------------<  126<H>  4.4   |  21<L>  |  1.07    Ca    9.8      01 Aug 2018 19:37    TPro  7.2  /  Alb  4.7  /  TBili  0.8  /  DBili  x   /  AST  27  /  ALT  37  /  AlkPhos  67  08-01      Imaging: MRI with preliminary read showing small enhancing focus cannot r/o osteomyelitis     A/P: 68yMale w/ generalized pain/back pain s/p syncope  -Obtain ESR/CRP/blood cultures  -No OR at this time; if organism identified on blood cultures then rec IV antibiotics for possible osteo  - work up for unwitnessed syncope in the setting of fever/shortness of breath  -r/o head trauma s/p syncope  -will offer definitive recs once labs back  Discussed with chief/attending Orthopaedic Consult Note    Consult Requested by: ED  Surgeon: Luisito    CC:Patient is a 68y old  Male who presents with a chief complaint of back pain s/p unwitnessed syncope    HPI  68yMale  c/o generalized aches and pain. 4 days ago he began feeling a small pain in his mid back which eventually went away. On 7/31 pt was using restroom and syncopized, since then he has been having generalized muscle/joint aches, the worst of which are in the mid back laterally and radiate toward the midline. He endorses shoulder and knee pain as well as some SOB and chest pain. Pain is worse with movement and when sitting up he feels pain radiating anteriorly on the b/l flanks. He does not complain of fever but had a low grade fever rectally of 100.7 in ER. Denies weakness, paresthesias, leg pain, numbness, tingling, recent travel or illness, no sick contacts.     PAST MEDICAL & SURGICAL HISTORY:    Allergies    No Known Allergies    Intolerances      MEDICATIONS  (STANDING):  ondansetron Injectable 4 milliGRAM(s) IV Push once      Vital Signs Last 24 Hrs  T(C): 37.2 (02 Aug 2018 01:03), Max: 38.2 (01 Aug 2018 20:47)  T(F): 98.9 (02 Aug 2018 01:03), Max: 100.7 (01 Aug 2018 20:47)  HR: 101 (02 Aug 2018 01:03) (100 - 112)  BP: 131/79 (02 Aug 2018 01:03) (124/72 - 161/80)  BP(mean): --  RR: 29 (02 Aug 2018 01:03) (18 - 29)  SpO2: 97% (02 Aug 2018 01:03) (97% - 97%)    Physical Exam:  General: NAD, alert & oriented x 3  R+ L LE    Motor: 5/5 GS/TA/EHL/FHL    Sensation: SILT s/sp/dp/tib  Pulses:  DP/PT 2+ , toes/foot WWP    R + L UE    Motor: 5/5 wrist flexion, extension/, palmar intrinsics/bicep/tricep/delt  Sensation: SILT med/uln/rad/musc/axillary   Pulses:  rad/brach 2+ , fingers/hand WWP    Spine:       Inspection: Neck and spine have no noted deformities or signs of inflammation. Curvature of cervical, thoracic, and lumbar spine are WNL. Bony features of shoulders and hips are      of equal height bilaterally. Posture is upright         Palpation: Spinous processes of C7-L5 palpable, midline, non-tender; no step-offs.          ROM: Flexion, extension, lateral bending and rotation of cervical spine wnl without discomfort, however movement of lumbar spine causes pain, specifically in the anterior flanks. Spurling maneuver negative. Straight leg test negatvie bilaterally         Reflexes: Downgoing toes bilaterally.         Motor: Good muscle bulk & tone. 5/5 deltoid, biceps, triceps, wrist flex/ext, hand , quads, hamstrings, ankle plantar/dorsiflexion.   Intact motor in C5-T1 / L1-S1 distributions.         Sensation: SILT throughout                              14.9   22.7  )-----------( 236      ( 01 Aug 2018 19:37 )             43.0     08-01    142  |  103  |  20  ----------------------------<  126<H>  4.4   |  21<L>  |  1.07    Ca    9.8      01 Aug 2018 19:37    TPro  7.2  /  Alb  4.7  /  TBili  0.8  /  DBili  x   /  AST  27  /  ALT  37  /  AlkPhos  67  08-01      Imaging: MRI with preliminary read showing small enhancing focus cannot r/o osteomyelitis     A/P: 68yMale w/ generalized pain/back pain s/p syncope  -Obtain ESR/CRP/blood cultures  -No OR at this time; if organism identified on blood cultures then rec IV antibiotics for possible osteo  - cardiac work up for unwitnessed syncope in the setting of chest pain/fever/shortness of breath    -will offer definitive recs once labs back  Discussed with chief/attending Orthopaedic Consult Note    Consult Requested by: ED  Surgeon: Luisito    CC:Patient is a 68y old  Male who presents with a chief complaint of back pain s/p unwitnessed syncope    HPI  68yMale  c/o generalized aches and pain. 4 days ago he began feeling a small pain in his mid back which eventually went away. On 7/31 pt was using restroom and syncopized, since then he has been having generalized muscle/joint aches, the worst of which are in the mid back laterally and radiate toward the midline. He endorses shoulder and knee pain as well as some SOB and chest pain. Pain is worse with movement and when sitting up he feels pain radiating anteriorly on the b/l flanks. He does not complain of fever but had a low grade fever rectally of 100.7 in ER. Denies weakness, paresthesias, leg pain, numbness, tingling, recent travel or illness, no sick contacts.     PAST MEDICAL & SURGICAL HISTORY:    Allergies    No Known Allergies    Intolerances      MEDICATIONS  (STANDING):  ondansetron Injectable 4 milliGRAM(s) IV Push once      Vital Signs Last 24 Hrs  T(C): 37.2 (02 Aug 2018 01:03), Max: 38.2 (01 Aug 2018 20:47)  T(F): 98.9 (02 Aug 2018 01:03), Max: 100.7 (01 Aug 2018 20:47)  HR: 101 (02 Aug 2018 01:03) (100 - 112)  BP: 131/79 (02 Aug 2018 01:03) (124/72 - 161/80)  BP(mean): --  RR: 29 (02 Aug 2018 01:03) (18 - 29)  SpO2: 97% (02 Aug 2018 01:03) (97% - 97%)    Physical Exam:  General: NAD, alert & oriented x 3  R+ L LE    Motor: 5/5 GS/TA/EHL/FHL    Sensation: SILT s/sp/dp/tib  Pulses:  DP/PT 2+ , toes/foot WWP    R + L UE    Motor: 5/5 wrist flexion, extension/, palmar intrinsics/bicep/tricep/delt  Sensation: SILT med/uln/rad/musc/axillary   Pulses:  rad/brach 2+ , fingers/hand WWP    Spine:       Inspection: Neck and spine have no noted deformities or signs of inflammation. Curvature of cervical, thoracic, and lumbar spine are WNL. Bony features of shoulders and hips are      of equal height bilaterally. Posture is upright         Palpation: Spinous processes of C7-L5 palpable, midline, non-tender; no step-offs.          ROM: Flexion, extension, lateral bending and rotation of cervical spine wnl without discomfort, however movement of lumbar spine causes pain, specifically in the anterior flanks. Spurling maneuver negative. Straight leg test negatvie bilaterally         Reflexes: Downgoing toes bilaterally.         Motor: Good muscle bulk & tone. 5/5 deltoid, biceps, triceps, wrist flex/ext, hand , quads, hamstrings, ankle plantar/dorsiflexion.   Intact motor in C5-T1 / L1-S1 distributions.         Sensation: SILT throughout                              14.9   22.7  )-----------( 236      ( 01 Aug 2018 19:37 )             43.0     08-01    142  |  103  |  20  ----------------------------<  126<H>  4.4   |  21<L>  |  1.07    Ca    9.8      01 Aug 2018 19:37    TPro  7.2  /  Alb  4.7  /  TBili  0.8  /  DBili  x   /  AST  27  /  ALT  37  /  AlkPhos  67  08-01      Imaging: MRI with preliminary read showing small enhancing focus cannot r/o osteomyelitis     A/P: 68yMale w/ generalized pain/back pain s/p syncope  -Obtain ESR/CRP/blood cultures  -No OR at this time; if organism identified on blood cultures then rec IV antibiotics for possible osteo  - If BCx negative, would consult IR for biopsy to definitively diagnose; as long as pt stable do not start IV abx until diagnosis obtained  - cardiac work up for unwitnessed syncope in the setting of chest pain/fever/shortness of breath in the meantime  Discussed with chief/attending Orthopaedic Consult Note    Consult Requested by: ED  Surgeon: Luisito    CC:Patient is a 68y old  Male who presents with a chief complaint of back pain s/p unwitnessed syncope    HPI  68yMale  c/o generalized aches and pain. 4 days ago he began feeling a small pain in his mid back which eventually went away. On 7/31 pt was using restroom and syncopized, since then he has been having generalized muscle/joint aches, the worst of which are in the mid back laterally and radiate toward the midline. He endorses shoulder and knee pain as well as some SOB and chest pain. Pain is worse with movement and when sitting up he feels pain radiating anteriorly on the b/l flanks. He does not complain of fever but had a low grade fever rectally of 100.7 in ER. Denies weakness, paresthesias, leg pain, numbness, tingling, recent travel or illness, no sick contacts.     PAST MEDICAL & SURGICAL HISTORY:    Allergies    No Known Allergies    Intolerances      MEDICATIONS  (STANDING):  ondansetron Injectable 4 milliGRAM(s) IV Push once      Vital Signs Last 24 Hrs  T(C): 37.2 (02 Aug 2018 01:03), Max: 38.2 (01 Aug 2018 20:47)  T(F): 98.9 (02 Aug 2018 01:03), Max: 100.7 (01 Aug 2018 20:47)  HR: 101 (02 Aug 2018 01:03) (100 - 112)  BP: 131/79 (02 Aug 2018 01:03) (124/72 - 161/80)  BP(mean): --  RR: 29 (02 Aug 2018 01:03) (18 - 29)  SpO2: 97% (02 Aug 2018 01:03) (97% - 97%)    Physical Exam:  General: NAD, alert & oriented x 3  R+ L LE    Motor: 5/5 GS/TA/EHL/FHL    Sensation: SILT s/sp/dp/tib  Pulses:  DP/PT 2+ , toes/foot WWP    R + L UE    Motor: 5/5 wrist flexion, extension/, palmar intrinsics/bicep/tricep/delt  Sensation: SILT med/uln/rad/musc/axillary   Pulses:  rad/brach 2+ , fingers/hand WWP    Spine:       Inspection: Neck and spine have no noted deformities or signs of inflammation. Curvature of cervical, thoracic, and lumbar spine are WNL. Bony features of shoulders and hips are      of equal height bilaterally. Posture is upright         Palpation: Spinous processes of C7-L5 palpable, midline, non-tender; no step-offs.          ROM: Flexion, extension, lateral bending and rotation of cervical spine wnl without discomfort, however movement of lumbar spine causes pain, specifically in the anterior flanks. Spurling maneuver negative. Straight leg test negatvie bilaterally         Reflexes: Downgoing toes bilaterally.         Motor: Good muscle bulk & tone. 5/5 deltoid, biceps, triceps, wrist flex/ext, hand , quads, hamstrings, ankle plantar/dorsiflexion.   Intact motor in C5-T1 / L1-S1 distributions.         Sensation: SILT throughout                              14.9   22.7  )-----------( 236      ( 01 Aug 2018 19:37 )             43.0     08-01    142  |  103  |  20  ----------------------------<  126<H>  4.4   |  21<L>  |  1.07    Ca    9.8      01 Aug 2018 19:37    TPro  7.2  /  Alb  4.7  /  TBili  0.8  /  DBili  x   /  AST  27  /  ALT  37  /  AlkPhos  67  08-01      Imaging: MRI with preliminary read showing small enhancing focus cannot r/o osteomyelitis     A/P: 68yMale w/ generalized pain/back pain s/p syncope  -Obtain ESR/CRP/blood cultures  -No OR at this time; if organism identified on blood cultures then rec IV antibiotics for possible osteo  - If BCx negative, would consult IR for biopsy to definitively diagnose; as long as pt stable do not start IV abx until diagnosis obtained  - would obtain ID consult  - cardiac work up for unwitnessed syncope in the setting of chest pain/fever/shortness of breath in the meantime  Discussed with chief/attending

## 2018-08-02 NOTE — H&P ADULT - PROBLEM SELECTOR PLAN 3
- continue home losartan as long as bp> 140 mmhg - Holding off losartan in the setting of severe sepsis

## 2018-08-02 NOTE — H&P ADULT - NSHPSOCIALHISTORY_GEN_ALL_CORE
not tobacco use, no illicit drug use  alcohol occasionally, hx of Alcohol abuse in the past on Naltrexone  MSM, lives with

## 2018-08-03 VITALS
HEART RATE: 85 BPM | RESPIRATION RATE: 18 BRPM | DIASTOLIC BLOOD PRESSURE: 76 MMHG | TEMPERATURE: 98 F | OXYGEN SATURATION: 96 % | SYSTOLIC BLOOD PRESSURE: 129 MMHG

## 2018-08-03 DIAGNOSIS — R55 SYNCOPE AND COLLAPSE: ICD-10-CM

## 2018-08-03 DIAGNOSIS — M54.9 DORSALGIA, UNSPECIFIED: ICD-10-CM

## 2018-08-03 DIAGNOSIS — I10 ESSENTIAL (PRIMARY) HYPERTENSION: ICD-10-CM

## 2018-08-03 DIAGNOSIS — R50.9 FEVER, UNSPECIFIED: ICD-10-CM

## 2018-08-03 DIAGNOSIS — A41.9 SEPSIS, UNSPECIFIED ORGANISM: ICD-10-CM

## 2018-08-03 LAB
ANION GAP SERPL CALC-SCNC: 16 MMOL/L — SIGNIFICANT CHANGE UP (ref 5–17)
BUN SERPL-MCNC: 12 MG/DL — SIGNIFICANT CHANGE UP (ref 7–23)
CALCIUM SERPL-MCNC: 9.1 MG/DL — SIGNIFICANT CHANGE UP (ref 8.4–10.5)
CHLORIDE SERPL-SCNC: 101 MMOL/L — SIGNIFICANT CHANGE UP (ref 96–108)
CO2 SERPL-SCNC: 24 MMOL/L — SIGNIFICANT CHANGE UP (ref 22–31)
CREAT SERPL-MCNC: 0.93 MG/DL — SIGNIFICANT CHANGE UP (ref 0.5–1.3)
GLUCOSE SERPL-MCNC: 89 MG/DL — SIGNIFICANT CHANGE UP (ref 70–99)
HCT VFR BLD CALC: 39.9 % — SIGNIFICANT CHANGE UP (ref 39–50)
HGB BLD-MCNC: 13.4 G/DL — SIGNIFICANT CHANGE UP (ref 13–17)
LACTATE SERPL-SCNC: 1 MMOL/L — SIGNIFICANT CHANGE UP (ref 0.5–2)
MCHC RBC-ENTMCNC: 30.5 PG — SIGNIFICANT CHANGE UP (ref 27–34)
MCHC RBC-ENTMCNC: 33.6 G/DL — SIGNIFICANT CHANGE UP (ref 32–36)
MCV RBC AUTO: 90.9 FL — SIGNIFICANT CHANGE UP (ref 80–100)
PLATELET # BLD AUTO: 188 K/UL — SIGNIFICANT CHANGE UP (ref 150–400)
POTASSIUM SERPL-MCNC: 3.7 MMOL/L — SIGNIFICANT CHANGE UP (ref 3.5–5.3)
POTASSIUM SERPL-SCNC: 3.7 MMOL/L — SIGNIFICANT CHANGE UP (ref 3.5–5.3)
RBC # BLD: 4.39 M/UL — SIGNIFICANT CHANGE UP (ref 4.2–5.8)
RBC # FLD: 12.2 % — SIGNIFICANT CHANGE UP (ref 10.3–16.9)
SODIUM SERPL-SCNC: 141 MMOL/L — SIGNIFICANT CHANGE UP (ref 135–145)
WBC # BLD: 10.2 K/UL — SIGNIFICANT CHANGE UP (ref 3.8–10.5)
WBC # FLD AUTO: 10.2 K/UL — SIGNIFICANT CHANGE UP (ref 3.8–10.5)

## 2018-08-03 PROCEDURE — 78800 RP LOCLZJ TUM 1 AREA 1 D IMG: CPT

## 2018-08-03 PROCEDURE — 85610 PROTHROMBIN TIME: CPT

## 2018-08-03 PROCEDURE — 93306 TTE W/DOPPLER COMPLETE: CPT

## 2018-08-03 PROCEDURE — A9556: CPT

## 2018-08-03 PROCEDURE — 82803 BLOOD GASES ANY COMBINATION: CPT

## 2018-08-03 PROCEDURE — 80053 COMPREHEN METABOLIC PANEL: CPT

## 2018-08-03 PROCEDURE — A9585: CPT

## 2018-08-03 PROCEDURE — 96368 THER/DIAG CONCURRENT INF: CPT | Mod: XU

## 2018-08-03 PROCEDURE — 73030 X-RAY EXAM OF SHOULDER: CPT

## 2018-08-03 PROCEDURE — 72147 MRI CHEST SPINE W/DYE: CPT

## 2018-08-03 PROCEDURE — 81003 URINALYSIS AUTO W/O SCOPE: CPT

## 2018-08-03 PROCEDURE — 96366 THER/PROPH/DIAG IV INF ADDON: CPT | Mod: XU

## 2018-08-03 PROCEDURE — 72158 MRI LUMBAR SPINE W/O & W/DYE: CPT

## 2018-08-03 PROCEDURE — 86140 C-REACTIVE PROTEIN: CPT

## 2018-08-03 PROCEDURE — 70450 CT HEAD/BRAIN W/O DYE: CPT

## 2018-08-03 PROCEDURE — 71045 X-RAY EXAM CHEST 1 VIEW: CPT

## 2018-08-03 PROCEDURE — 87040 BLOOD CULTURE FOR BACTERIA: CPT

## 2018-08-03 PROCEDURE — 85027 COMPLETE CBC AUTOMATED: CPT

## 2018-08-03 PROCEDURE — 85652 RBC SED RATE AUTOMATED: CPT

## 2018-08-03 PROCEDURE — 85730 THROMBOPLASTIN TIME PARTIAL: CPT

## 2018-08-03 PROCEDURE — 78805: CPT | Mod: 26

## 2018-08-03 PROCEDURE — 96365 THER/PROPH/DIAG IV INF INIT: CPT | Mod: XU

## 2018-08-03 PROCEDURE — 71275 CT ANGIOGRAPHY CHEST: CPT

## 2018-08-03 PROCEDURE — 82550 ASSAY OF CK (CPK): CPT

## 2018-08-03 PROCEDURE — 84295 ASSAY OF SERUM SODIUM: CPT

## 2018-08-03 PROCEDURE — 85025 COMPLETE CBC W/AUTO DIFF WBC: CPT

## 2018-08-03 PROCEDURE — 83605 ASSAY OF LACTIC ACID: CPT

## 2018-08-03 PROCEDURE — 74174 CTA ABD&PLVS W/CONTRAST: CPT

## 2018-08-03 PROCEDURE — 80048 BASIC METABOLIC PNL TOTAL CA: CPT

## 2018-08-03 PROCEDURE — 84484 ASSAY OF TROPONIN QUANT: CPT

## 2018-08-03 PROCEDURE — 36415 COLL VENOUS BLD VENIPUNCTURE: CPT

## 2018-08-03 PROCEDURE — 87086 URINE CULTURE/COLONY COUNT: CPT

## 2018-08-03 PROCEDURE — 93005 ELECTROCARDIOGRAM TRACING: CPT

## 2018-08-03 PROCEDURE — 84132 ASSAY OF SERUM POTASSIUM: CPT

## 2018-08-03 PROCEDURE — 80307 DRUG TEST PRSMV CHEM ANLYZR: CPT

## 2018-08-03 PROCEDURE — 72125 CT NECK SPINE W/O DYE: CPT

## 2018-08-03 PROCEDURE — 96376 TX/PRO/DX INJ SAME DRUG ADON: CPT

## 2018-08-03 PROCEDURE — 96375 TX/PRO/DX INJ NEW DRUG ADDON: CPT | Mod: XU

## 2018-08-03 PROCEDURE — 82553 CREATINE MB FRACTION: CPT

## 2018-08-03 PROCEDURE — 99285 EMERGENCY DEPT VISIT HI MDM: CPT | Mod: 25

## 2018-08-03 PROCEDURE — 82330 ASSAY OF CALCIUM: CPT

## 2018-08-03 RX ORDER — LIDOCAINE 4 G/100G
1 CREAM TOPICAL
Qty: 10 | Refills: 0 | OUTPATIENT
Start: 2018-08-03 | End: 2018-08-12

## 2018-08-03 RX ORDER — ACETAMINOPHEN 500 MG
3 TABLET ORAL
Qty: 0 | Refills: 0 | COMMUNITY
Start: 2018-08-03

## 2018-08-03 RX ORDER — NALTREXONE HYDROCHLORIDE 50 MG/1
1 TABLET, FILM COATED ORAL
Qty: 0 | Refills: 0 | COMMUNITY

## 2018-08-03 RX ORDER — LOSARTAN POTASSIUM 100 MG/1
1 TABLET, FILM COATED ORAL
Qty: 0 | Refills: 0 | COMMUNITY

## 2018-08-03 RX ADMIN — Medication 975 MILLIGRAM(S): at 09:57

## 2018-08-03 RX ADMIN — HEPARIN SODIUM 7500 UNIT(S): 5000 INJECTION INTRAVENOUS; SUBCUTANEOUS at 08:05

## 2018-08-03 RX ADMIN — Medication 975 MILLIGRAM(S): at 00:58

## 2018-08-03 RX ADMIN — LIDOCAINE 1 PATCH: 4 CREAM TOPICAL at 12:12

## 2018-08-03 RX ADMIN — HEPARIN SODIUM 7500 UNIT(S): 5000 INJECTION INTRAVENOUS; SUBCUTANEOUS at 14:50

## 2018-08-03 RX ADMIN — Medication 50 MILLIGRAM(S): at 12:20

## 2018-08-03 RX ADMIN — Medication 975 MILLIGRAM(S): at 10:30

## 2018-08-03 RX ADMIN — Medication 50 MILLIGRAM(S): at 01:02

## 2018-08-03 NOTE — PROGRESS NOTE ADULT - ATTENDING COMMENTS
Fever/  discitis?--biopsy/cult p  ETOH--not active Fever/--afebrile  discitis?--biopsy/cult p  ETOH--not active  Ga  negative  full study p  No AB at this time

## 2018-08-03 NOTE — PROGRESS NOTE ADULT - ASSESSMENT
I reviewed the MRI of the spine with radiology yesterday. He felt the findings were equivocal as regards a discitis

## 2018-08-03 NOTE — PROGRESS NOTE ADULT - PROBLEM SELECTOR PLAN 7
1) PCP   Contacted on Admission: Dr. Estiven Adams  2) Date of Contact with PCP:  3) PCP Contacted at Discharge: (Y/N)  4) Summary of Handoff Given to PCP:   5) Post-Discharge Appointment Date and Location:

## 2018-08-03 NOTE — PROGRESS NOTE ADULT - PROBLEM SELECTOR PLAN 6
DVT ppx: on hold pending Bx  GI ppx: none required  F: none  E: replete as needed  N: regular diet  full code  dispo RMF

## 2018-08-03 NOTE — DISCHARGE NOTE ADULT - ADDITIONAL INSTRUCTIONS
Please schedule an appointment with Dr. Dykes to discuss your back pain. Please schedule an appointment with Dr. Tran to review results of your Gallium scan.

## 2018-08-03 NOTE — PROGRESS NOTE ADULT - ASSESSMENT
ASSESSMENT/PLAN68 yo M c HTN, presenting c severe lower  back pain improved now lung atelectases.  PMH of alcohol abuse in the past/HTN/ hep C    1. O2 observe off  2. Bronchodilators:  Atrovent/ albuterol q 4 – 6 hours as needed  3. Corticosteroids: off  4. ID/Antibiotics: pt discussed c ID, no pulmonary indication for ABX  5. Cardiac/HTN: optimize BP  6. GI: Rx/ prophylaxis c PPI/H2B  7. Heme: Rx/VT prophylaxis c SQH/SCD  8. Aspiration precautions  9. Use incentive spirometry  Discussed with managing team

## 2018-08-03 NOTE — PROGRESS NOTE ADULT - PROBLEM SELECTOR PLAN 3
- syncope with vasovagal prodrome. BM on toilet, passed out, woke up diaphoretic  - cards rec echo, carotid dopper, orthostatics, hold AVN blockers

## 2018-08-03 NOTE — PROGRESS NOTE ADULT - PROBLEM SELECTOR PLAN 2
- 5d h/o 10/10 diffuse back pain nonradiating to extremities  - denies incontinence, numbness, tingling - 5d h/o 10/10 diffuse back pain nonradiating to extremities  - denies incontinence, numbness, tingling  - MRI imagining showed uptake in lumbar spine, possible discitis  - gallium scan, will f/u  - possible Bx

## 2018-08-03 NOTE — PROGRESS NOTE ADULT - PROBLEM SELECTOR PROBLEM 2
Alcohol abuse
Back pain, unspecified back location, unspecified back pain laterality, unspecified chronicity
Back pain, unspecified back location, unspecified back pain laterality, unspecified chronicity

## 2018-08-03 NOTE — PROGRESS NOTE ADULT - PROBLEM SELECTOR PLAN 1
- T 100.7, WBC 22.7. 2/4 SIRS. Possible discitis? f/u gallium scan  - BCx NGTD  - ortho and Neuro rec infectious w/o.   - ID rec IR Bx, will f/u today

## 2018-08-03 NOTE — DISCHARGE NOTE ADULT - CARE PROVIDER_API CALL
Abdullahi Tran), Infectious Disease; Internal Medicine  [address] [address]  Phone: [phone]  Fax: [phone]    Estiven Dykes), Internal Medicine  [address] [address]  Phone: [phone]  Fax: [phone]

## 2018-08-03 NOTE — PROGRESS NOTE ADULT - PROBLEM SELECTOR PLAN 5
- on PO naltrexone, no h/o or S/Sx cirrhosis   - occasional etoh now. No S/Sx etoh w/d no need for CIWA

## 2018-08-03 NOTE — PROGRESS NOTE ADULT - SUBJECTIVE AND OBJECTIVE BOX
INTERVAL HPI/OVERNIGHT EVENTS:    ANTIBIOTICS    MEDICATIONS  (STANDING):  heparin  Injectable 7500 Unit(s) SubCutaneous every 8 hours  lidocaine   Patch 1 Patch Transdermal daily  pregabalin 50 milliGRAM(s) Oral three times a day    MEDICATIONS  (PRN):  acetaminophen   Tablet. 975 milliGRAM(s) Oral every 6 hours PRN Moderate Pain (4 - 6)  morphine  - Injectable 2 milliGRAM(s) IV Push every 4 hours PRN Severe Pain (7 - 10)      Allergies    No Known Allergies    Intolerances        REVIEW OF SYSTEMS:    Constitutional: No fever, weight loss or fatigue  Eyes: No eye pain, visual disturbances, or discharge  ENMT:  No difficulty hearing, tinnitus, vertigo; No sinus or throat pain  Neck: No pain or stiffness  Respiratory: No cough, wheezing, chills or hemoptysis  Cardiovascular: No chest pain, palpitations, shortness of breath, dizziness or leg swelling  Gastrointestinal: No abdominal or epigastric pain. No nausea, vomiting or hematemesis; No diarrhea or constipation. No melena or hematochezia.  Genitourinary: No dysuria, frequency, hematuria or incontinence  Rectal: No pain, hemorrhoids or incontinence  Neurological: No headaches, memory loss, loss of strength, numbness or tremors  Skin: No itching, burning, rashes or lesions   Lymph Nodes: No enlarged glands  Endocrine: No heat or cold intolerance; No hair loss  Musculoskeletal: back pain  Heme/Lymph: No easy bruising or bleeding gums  Allergy and Immunologic: No hives or eczema    Vital Signs Last 24 Hrs  T(C): 37.3 (03 Aug 2018 09:27), Max: 37.6 (02 Aug 2018 21:24)  T(F): 99.1 (03 Aug 2018 09:27), Max: 99.6 (02 Aug 2018 21:24)  HR: 88 (03 Aug 2018 09:27) (73 - 91)  BP: 146/85 (03 Aug 2018 09:27) (116/72 - 146/87)  BP(mean): --  RR: 16 (03 Aug 2018 09:27) (16 - 19)  SpO2: 95% (03 Aug 2018 09:27) (94% - 95%)    PHYSICAL EXAM:    General: Well developed; well nourished; in no acute distress  Eyes: PERRL, EOM intact; conjunctiva and sclera clear  Head: Normocephalic; atraumatic  ENMT: No nasal discharge; airway clear  Neck: Supple; non tender; no masses  Respiratory: No wheezes, rales or rhonchi  Cardiovascular: Regular rate and rhythm. S1 and S2 Normal; No murmurs, gallops or rubs  Gastrointestinal: Soft non-tender non-distended; Normal bowel sounds; No hepatosplenomegaly  Genitourinary: No costovertebral angle tenderness  Extremities: Normal range of motion, No clubbing, cyanosis or edema  Vascular: Peripheral pulses palpable 2+ bilaterally  Neurological: Alert and oriented x3  Skin: Warm and dry. No acute rash  Lymph Nodes: No acute cervical adenopathy  Musculoskeletal: Normal gait, tone, without deformities    LABS:                        13.4   10.2  )-----------( 188      ( 03 Aug 2018 07:07 )             39.9         141  |  101  |  12  ----------------------------<  89  3.7   |  24  |  0.93    Ca    9.1      03 Aug 2018 07:07    TPro  6.8  /  Alb  4.3  /  TBili  0.7  /  DBili  x   /  AST  22  /  ALT  30  /  AlkPhos  58  08    PT/INR - ( 01 Aug 2018 19:37 )   PT: 11.2 sec;   INR: 1.01          PTT - ( 01 Aug 2018 19:37 )  PTT:27.8 sec  Urinalysis Basic - ( 02 Aug 2018 09:53 )    Color: Yellow / Appearance: Clear / S.010 / pH: x  Gluc: x / Ketone: NEGATIVE  / Bili: Negative / Urobili: 0.2 E.U./dL   Blood: x / Protein: NEGATIVE mg/dL / Nitrite: NEGATIVE   Leuk Esterase: NEGATIVE / RBC: x / WBC x   Sq Epi: x / Non Sq Epi: x / Bacteria: x          MICROBIOLOGY:  Culture Results:   No growth to date ( @ 10:59)  Culture Results:   No growth at 1 day. ( @ 21:29)  Culture Results:   No growth at 1 day. ( @ 21:29)      RADIOLOGY & ADDITIONAL STUDIES:
INTERVAL HPI/OVERNIGHT EVENTS:    ANTIBIOTICS    MEDICATIONS  (STANDING):  heparin  Injectable 7500 Unit(s) SubCutaneous every 8 hours  lidocaine   Patch 1 Patch Transdermal daily  pregabalin 50 milliGRAM(s) Oral three times a day    MEDICATIONS  (PRN):  acetaminophen   Tablet. 975 milliGRAM(s) Oral every 6 hours PRN Moderate Pain (4 - 6)  morphine  - Injectable 2 milliGRAM(s) IV Push every 4 hours PRN Severe Pain (7 - 10)      Allergies    No Known Allergies    Intolerances        REVIEW OF SYSTEMS:    Constitutional: No fever, weight loss or fatigue  Eyes: No eye pain, visual disturbances, or discharge  ENMT:  No difficulty hearing, tinnitus, vertigo; No sinus or throat pain  Neck: No pain or stiffness  Respiratory: No cough, wheezing, chills or hemoptysis  Cardiovascular: No chest pain, palpitations, shortness of breath, dizziness or leg swelling  Gastrointestinal: No abdominal or epigastric pain. No nausea, vomiting or hematemesis; No diarrhea or constipation. No melena or hematochezia.  Genitourinary: No dysuria, frequency, hematuria or incontinence  Rectal: No pain, hemorrhoids or incontinence  Neurological: No headaches, memory loss, loss of strength, numbness or tremors  Skin: No itching, burning, rashes or lesions   Lymph Nodes: No enlarged glands  Endocrine: No heat or cold intolerance; No hair loss  Musculoskeletal: back pain is better this afternoon  Heme/Lymph: No easy bruising or bleeding gums  Allergy and Immunologic: No hives or eczema    Vital Signs Last 24 Hrs  T(C): 36.8 (03 Aug 2018 15:25), Max: 37.6 (02 Aug 2018 21:24)  T(F): 98.3 (03 Aug 2018 15:25), Max: 99.6 (02 Aug 2018 21:24)  HR: 85 (03 Aug 2018 15:25) (85 - 91)  BP: 129/76 (03 Aug 2018 15:25) (116/72 - 146/87)  BP(mean): --  RR: 18 (03 Aug 2018 15:25) (16 - 19)  SpO2: 96% (03 Aug 2018 15:25) (94% - 96%)    PHYSICAL EXAM:    General: Well developed; well nourished; in no acute distress  Eyes: PERRL, EOM intact; conjunctiva and sclera clear  Head: Normocephalic; atraumatic  ENMT: No nasal discharge; airway clear  Neck: Supple; non tender; no masses  Respiratory: No wheezes, rales or rhonchi  Cardiovascular: Regular rate and rhythm. S1 and S2 Normal; No murmurs, gallops or rubs  Gastrointestinal: Soft non-tender non-distended; Normal bowel sounds; No hepatosplenomegaly  Genitourinary: No costovertebral angle tenderness  Extremities: Normal range of motion, No clubbing, cyanosis or edema  Vascular: Peripheral pulses palpable 2+ bilaterally  Neurological: Alert and oriented x3  Skin: Warm and dry. No acute rash  Lymph Nodes: No acute cervical adenopathy  Musculoskeletal: Normal gait, tone, without deformities    LABS:                        13.4   10.2  )-----------( 188      ( 03 Aug 2018 07:07 )             39.9         141  |  101  |  12  ----------------------------<  89  3.7   |  24  |  0.93    Ca    9.1      03 Aug 2018 07:07    TPro  6.8  /  Alb  4.3  /  TBili  0.7  /  DBili  x   /  AST  22  /  ALT  30  /  AlkPhos  58  08    PT/INR - ( 01 Aug 2018 19:37 )   PT: 11.2 sec;   INR: 1.01          PTT - ( 01 Aug 2018 19:37 )  PTT:27.8 sec  Urinalysis Basic - ( 02 Aug 2018 09:53 )    Color: Yellow / Appearance: Clear / S.010 / pH: x  Gluc: x / Ketone: NEGATIVE  / Bili: Negative / Urobili: 0.2 E.U./dL   Blood: x / Protein: NEGATIVE mg/dL / Nitrite: NEGATIVE   Leuk Esterase: NEGATIVE / RBC: x / WBC x   Sq Epi: x / Non Sq Epi: x / Bacteria: x          MICROBIOLOGY:  Culture Results:   No growth to date ( @ 10:59)  Culture Results:   No growth at 1 day. ( @ 21:29)  Culture Results:   No growth at 1 day. ( @ 21:29)      RADIOLOGY & ADDITIONAL STUDIES:
Interventional, Pulmonary, Critical, Chest Special Procedures.    Pt was seen and fully examined by myself.     Time spent with patient in minutes:77    Patient is a 68y old  Male who presents with a chief complaint of Severe back pain and sepsis (02 Aug 2018 06:35) Events leading to this admission reviewed. The patient ill appearing, inspiratory splinting pattern reviewed c pt, now improved. pt is eupneic on RA, LBP is better.  HPI:  5o yo M, presenting to ED for generalized malaise and severe low back pain started about 4-5 days ago.  abby pmh is notable for alcohol abuse on naltrexone currently and HTN.  he reports one episode of syncope on 7/31 when he was using the bathroom and following that event he has been having generalized body pain and severe low/mid back pain also is complaining of severe right shoulder pain, however denies direct trauma, pain is worse when he moves in the bad and wants to change position however gets better once he starts ambulating/ he reports some pain radiation to the legs worse on the left/he also reports low grade at home/ denies chills/ cough/ cp/sob/ palpitation/ abdominal pain/ diarrhea or urinary symptoms, reports some neck pain mostly when he moves to the sides, able to perform chin to chest/ reports some photophobia, denies HA or blurry vision.  in the ED he was found to have a rectal temp of 100.7 with WBC count of 22 with left shift and elevated lactate of 2.3  Lumbar MRI showing small area of enhancement at the level of L3, raising concern for discitis/ OM. (02 Aug 2018 06:35)    REVIEW OF SYSTEMS:  Constitutional: + fever, weight loss, + fatigue  Eyes: No eye pain, visual disturbances, or discharge  ENMT:  No difficulty hearing, tinnitus, vertigo; No sinus or throat pain. No epistaxis, dysphagia, dysphonia, hoarseness or odynophagia  Neck: No pain, stiffness or neck swelling.  No masses or deformities  Respiratory: No cough, wheezing, chills or hemoptysis  - COPD  - ILD   - PE   - ASTHMA     - PNEUMONIA  Cardiovascular: No chest pain, dysrhythmia, palpitations, dizziness or edema   - COPD     - CAD   - CHF   +HTN  Gastrointestinal: No abdominal or epigastric pain. No nausea, vomiting or hematemesis; No diarrhea or constipation. No melena or hematochezia. No dysphagia or Icterus.          Genitourinary: No dysuria, frequency, hematuria or incontinence   - CKD/ARTUR      - ESRD  Neurological: No headaches, memory loss, loss of strength, numbness or tremors      -DEMENTIA     - STROKE    - SEIZURE  Skin: No itching, burning, rashes or lesions   Lymph Nodes: No enlarged glands  Endocrine: No heat or cold intolerance; No hair loss       - DM     - THYROID DISORDER  Musculoskeletal: No joint pain or swelling; +R shoulder pain, LBP  pain  Psychiatric: No depression, anxiety, mood swings or difficulty sleeping  Heme/Lymph: No easy bruising or bleeding gums         - ANEMIA      - CANCER   -COAGULOPATHY  Allergy and Immunologic: No hives or eczema    PAST MEDICAL & SURGICAL HISTORY:  Hepatitis C: s/p Treatment in the 90s  Alcohol abuse  Hypertension  H/O arthroscopic knee surgery: Left meniscal tear  S/P appendectomy  S/P tonsillectomy    FAMILY HISTORY:  Family history of colon cancer (Father, Mother, Sibling)    SOCIAL HISTORY:      - Tobacco     - ETOH    Allergies    No Known Allergies    Intolerances      Vital Signs Last 24 Hrs  T(C): 36.8 (03 Aug 2018 15:25), Max: 37.6 (02 Aug 2018 21:24)  T(F): 98.3 (03 Aug 2018 15:25), Max: 99.6 (02 Aug 2018 21:24)  HR: 85 (03 Aug 2018 15:25) (85 - 91)  BP: 129/76 (03 Aug 2018 15:25) (116/72 - 146/87)  BP(mean): --  RR: 18 (03 Aug 2018 15:25) (16 - 19)  SpO2: 96% (03 Aug 2018 15:25) (94% - 96%)      PHYSICAL EXAM:  Un Comfortable, no immediate distress  Eyes: PERRL, EOM intact; conjunctiva and sclera clear  Head: Normocephalic;  No Trauma  ENMT: No nasal discharge, hoarseness, cough or hemoptysis.   Neck: Supple; non tender; no masses or deformities.    No JVD  Respiratory:  - WHEEZING   - RHONCHI  - RALES  - CRACKLES.  Diminished breath sounds  BILATERAL  RIGHT  LEFT bases, mild inspiratory splinting present  Cardiovascular: Regular rate and rhythm. S1 and S2 Normal; No murmurs, gallops or rubs     - PPM/AICD  Gastrointestinal: Soft non-tender, non-distended; Normal bowel sounds; No hepatosplenomegaly.     -PEG    -  GT   - PORTILLO  Genitourinary: No costovertebral angle tenderness. No dysuria  Extremities: AROM, No clubbing, cyanosis or edema    Vascular: Peripheral pulses palpable 2+ bilaterally  Neurological: Alert and responisve to stimuli   Skin: Warm and dry. No obvious rash  Lymph Nodes: No acute cervical or supraclavicular adenopathy  Psychiatric: Cooperative and appropriate mood    DEVICES:  - DENTURES   +IV R / L     - ETUBE   -TRACH   -CTUBE  R / L      LABS:                          13.4   10.2  )-----------( 188      ( 03 Aug 2018 07:07 )             39.9     08-03    141  |  101  |  12  ----------------------------<  89  3.7   |  24  |  0.93    Ca    9.1      03 Aug 2018 07:07    TPro  6.8  /  Alb  4.3  /  TBili  0.7  /  DBili  x   /  AST  22  /  ALT  30  /  AlkPhos  58  08-02    PT/INR - ( 01 Aug 2018 19:37 )   PT: 11.2 sec;   INR: 1.01          PTT - ( 01 Aug 2018 19:37 )  PTT:27.8 sec  < from: NM Inflammatory Loc Limited Area, Gallium (08.03.18 @ 13:26) >    EXAM:  NM INFLAMMATORY LOC GALLIUMLTD                          PROCEDURE DATE:  08/03/2018          INTERPRETATION:    INFLAMMATION IMAGING - LIMITED AREA    Indication: Degenerative changes in the L2-3 level. This study is being   performed to rule out infection in the L2-3 disc space.    Procedure: The patient received an intravenous injection of 6 mCi of   gallium-67 citrate on 8/2/2018 and images of the lumbar spine were   obtained at about 24 hours.    Findings: No abnormal accumulations of the radiopharmaceutical are seen.   There is no evidence of discitis or osteomyelitis in the lumbar spine.    Impression: No evidence of disc space infection or vertebral body   infection is identified on this study.    < end of copied text >  < from: CT Angio Chest w/ IV Cont (08.02.18 @ 03:45) >    EXAM:  CT ANGIO CHEST (W)AW IC                          EXAM:  CT ANGIO ABD PELV (W)AW IC                          PROCEDURE DATE:  08/02/2018          INTERPRETATION:  CTA OF THE CHEST, ABDOMEN, AND PELVIS WITH CONTRAST    INDICATION:  dissection protocol    TECHNIQUE:  CTA (CT ANGIOGRAPHY) of the chest, abdomen and pelvis was   performed. Multiplanar images of the chest, abdomen and pelvis were   reconstructed on an independent work-station and reviewed with image   postprocessing. Multiplanar maximal intensity projection images were also   provided.    COMPARISON:  None    FINDINGS:  No aortic dissection. Aortic branches are patent the main pulmonary   artery is dilated up to 3.7 cm consistent with some degree of pulmonary   arterial hypertension. Hepatic steatosis. Gallbladder, spleen, pancreas,   right adrenal gland and bilateral kidneys are unremarkable. Nonspecific   nodular thickening of the left adrenal gland. Urinary bladder grossly   unremarkable. No bowel obstruction, pericolic inflammatory change,   intra-abdominal ascites or pneumoperitoneum. Bibasilar atelectasis. No   consolidation, pleural effusion or pneumothorax. No mediastinal, axillary   or retroperitoneal adenopathy. Small right and moderate left   fat-containing inguinal hernia. Degenerative changes throughout the   spine. Refer to prior thoracic and lumbar spine MRI for further   information.      IMPRESSION:  No acute findings.    < end of copied text >  RADIOLOGY & ADDITIONAL STUDIES (The following images were personally reviewed):
OVERNIGHT EVENTS: no acute events    SUBJECTIVE / INTERVAL HPI: Patient seen and examined at bedside. He is still complaining of back pain, diffuse, nonradiating, same as yesterday. He denies f/v, chest pain, SOB, n/v/c/d. He has pain in his right shoulder, which he attributes to a previous injury.      VITAL SIGNS:  Vital Signs Last 24 Hrs  T(C): 37.3 (03 Aug 2018 09:27), Max: 37.6 (02 Aug 2018 21:24)  T(F): 99.1 (03 Aug 2018 09:27), Max: 99.6 (02 Aug 2018 21:24)  HR: 88 (03 Aug 2018 09:) (73 - 91)  BP: 146/85 (03 Aug 2018 09:) (116/72 - 146/87)  BP(mean): --  RR: 16 (03 Aug 2018 09:27) (16 - 19)  SpO2: 95% (03 Aug 2018 09:) (94% - 95%)    PHYSICAL EXAM:    General: WDWN  HEENT: NCAT; PERRL, anicteric sclera; MMM  Neck: supple, trachea midline  Cardiovascular: S1, S2 normal; RRR, no M/G/R  Respiratory: CTABL; no W/R/R  Gastrointestinal: soft, nontender, nondistended. bowel sounds present.   Skin: no ulcerations or visible rashes appreciated  Extremities: WWP; no edema, clubbing or cyanosis. Pain in R shoulder with ROM. Unable to perform back exam pt refused.   Vascular: 2+ radial, DP/PT pulses B/L  Neurological: AAOx3; CN II-XII grossly intact; no focal deficits    MEDICATIONS:  MEDICATIONS  (STANDING):  heparin  Injectable 7500 Unit(s) SubCutaneous every 8 hours  lidocaine   Patch 1 Patch Transdermal daily  pregabalin 50 milliGRAM(s) Oral three times a day    MEDICATIONS  (PRN):  acetaminophen   Tablet. 975 milliGRAM(s) Oral every 6 hours PRN Moderate Pain (4 - 6)  morphine  - Injectable 2 milliGRAM(s) IV Push every 4 hours PRN Severe Pain (7 - 10)      ALLERGIES:  Allergies    No Known Allergies    Intolerances        LABS:                        13.4   10.2  )-----------( 188      ( 03 Aug 2018 07:07 )             39.9     08-03    141  |  101  |  12  ----------------------------<  89  3.7   |  24  |  0.93    Ca    9.1      03 Aug 2018 07:07    TPro  6.8  /  Alb  4.3  /  TBili  0.7  /  DBili  x   /  AST  22  /  ALT  30  /  AlkPhos  58  08-02    PT/INR - ( 01 Aug 2018 19:37 )   PT: 11.2 sec;   INR: 1.01          PTT - ( 01 Aug 2018 19:37 )  PTT:27.8 sec  Urinalysis Basic - ( 02 Aug 2018 09:53 )    Color: Yellow / Appearance: Clear / S.010 / pH: x  Gluc: x / Ketone: NEGATIVE  / Bili: Negative / Urobili: 0.2 E.U./dL   Blood: x / Protein: NEGATIVE mg/dL / Nitrite: NEGATIVE   Leuk Esterase: NEGATIVE / RBC: x / WBC x   Sq Epi: x / Non Sq Epi: x / Bacteria: x      CAPILLARY BLOOD GLUCOSE          RADIOLOGY & ADDITIONAL TESTS: Reviewed.
SUBJECTIVE: back pain for several days following fall    OBJECTIVE: moves freely    Vital Signs Last 24 Hrs  T(C): 37.1 (02 Aug 2018 09:22), Max: 38.2 (01 Aug 2018 20:47)  T(F): 98.7 (02 Aug 2018 09:22), Max: 100.7 (01 Aug 2018 20:47)  HR: 98 (02 Aug 2018 09:22) (94 - 112)  BP: 143/86 (02 Aug 2018 09:22) (124/72 - 162/93)  BP(mean): --  RR: 18 (02 Aug 2018 09:22) (18 - 29)  SpO2: 93% (02 Aug 2018 09:22) (93% - 97%)    Affected extremity:               Sensation:                   Lower extremeity             sp         dp         saph       shaffer         tibial                                                R          +           +             +           +          +                                               L           +           +             +           +          +         Motor exam:                 Lower extremeity          HF(l2)   KE(l3)    TA(l4)   EHL(l5)  GS(s1)                                                 R        5/5        5/5        5/5       5/5         5/5                                               L         5/5        5/5       5/5       5/5          5/5                                                        LABS:                        13.2   14.0  )-----------( 211      ( 02 Aug 2018 10:50 )             39.9     08-01    142  |  103  |  20  ----------------------------<  126<H>  4.4   |  21<L>  |  1.07    Ca    9.8      01 Aug 2018 19:37    TPro  7.2  /  Alb  4.7  /  TBili  0.8  /  DBili  x   /  AST  27  /  ALT  37  /  AlkPhos  67  08-01    PT/INR - ( 01 Aug 2018 19:37 )   PT: 11.2 sec;   INR: 1.01       ESR normal   CRP 5       PTT - ( 01 Aug 2018 19:37 )  PTT:27.8 sec  Urinalysis Basic - ( 02 Aug 2018 09:53 )    Color: Yellow / Appearance: Clear / S.010 / pH: x  Gluc: x / Ketone: NEGATIVE  / Bili: Negative / Urobili: 0.2 E.U./dL   Blood: x / Protein: NEGATIVE mg/dL / Nitrite: NEGATIVE   Leuk Esterase: NEGATIVE / RBC: x / WBC x   Sq Epi: x / Non Sq Epi: x / Bacteria: x    MRI reviewed  Agree with changes on MRI more consistent with degenerative changes than infection/discitis        A/P :  68y Male with back pain following fall  Exam and imaging suggestive and consistent with exacerbation of underlying degenerative changes in the spine  low suspicion for infectious process in the spine  recommend following up on blood cultures and further fever work up  OOB with PT
OVERNIGHT EVENTS: no acute events    SUBJECTIVE / INTERVAL HPI: Patient seen and examined at bedside. He is still complaining of back pain, diffuse, nonradiating, same as yesterday. He denies f/v, chest pain, SOB, n/v/c/d. He has pain in his right shoulder, which he attributes to a previous injury.      VITAL SIGNS:  Vital Signs Last 24 Hrs  T(C): 37.3 (03 Aug 2018 09:27), Max: 37.6 (02 Aug 2018 21:24)  T(F): 99.1 (03 Aug 2018 09:27), Max: 99.6 (02 Aug 2018 21:24)  HR: 88 (03 Aug 2018 09:) (73 - 91)  BP: 146/85 (03 Aug 2018 09:) (116/72 - 146/87)  BP(mean): --  RR: 16 (03 Aug 2018 09:27) (16 - 19)  SpO2: 95% (03 Aug 2018 09:) (94% - 95%)    PHYSICAL EXAM:    General: WDWN  HEENT: NCAT; PERRL, anicteric sclera; MMM  Neck: supple, trachea midline  Cardiovascular: S1, S2 normal; RRR, no M/G/R  Respiratory: CTABL; no W/R/R  Gastrointestinal: soft, nontender, nondistended. bowel sounds present.   Skin: no ulcerations or visible rashes appreciated  Extremities: WWP; no edema, clubbing or cyanosis. Pain in R shoulder with ROM. Unable to perform back exam pt refused.   Vascular: 2+ radial, DP/PT pulses B/L  Neurological: AAOx3; CN II-XII grossly intact; no focal deficits    MEDICATIONS:  MEDICATIONS  (STANDING):  heparin  Injectable 7500 Unit(s) SubCutaneous every 8 hours  lidocaine   Patch 1 Patch Transdermal daily  pregabalin 50 milliGRAM(s) Oral three times a day    MEDICATIONS  (PRN):  acetaminophen   Tablet. 975 milliGRAM(s) Oral every 6 hours PRN Moderate Pain (4 - 6)  morphine  - Injectable 2 milliGRAM(s) IV Push every 4 hours PRN Severe Pain (7 - 10)      ALLERGIES:  Allergies    No Known Allergies    Intolerances        LABS:                        13.4   10.2  )-----------( 188      ( 03 Aug 2018 07:07 )             39.9     08-03    141  |  101  |  12  ----------------------------<  89  3.7   |  24  |  0.93    Ca    9.1      03 Aug 2018 07:07    TPro  6.8  /  Alb  4.3  /  TBili  0.7  /  DBili  x   /  AST  22  /  ALT  30  /  AlkPhos  58  08-02    PT/INR - ( 01 Aug 2018 19:37 )   PT: 11.2 sec;   INR: 1.01          PTT - ( 01 Aug 2018 19:37 )  PTT:27.8 sec  Urinalysis Basic - ( 02 Aug 2018 09:53 )    Color: Yellow / Appearance: Clear / S.010 / pH: x  Gluc: x / Ketone: NEGATIVE  / Bili: Negative / Urobili: 0.2 E.U./dL   Blood: x / Protein: NEGATIVE mg/dL / Nitrite: NEGATIVE   Leuk Esterase: NEGATIVE / RBC: x / WBC x   Sq Epi: x / Non Sq Epi: x / Bacteria: x      CAPILLARY BLOOD GLUCOSE          RADIOLOGY & ADDITIONAL TESTS: Reviewed.

## 2018-08-03 NOTE — DISCHARGE NOTE ADULT - PLAN OF CARE
to determine cause You were admitted due to concern for possible infection of the bone or spinal disc. After Gallium scan neither appeared to be infected. Due to no fever in over 24h you will be discharged home to follow up as an outpatient. If you have worsening fever, chills, nausea, vomiting please return to the ED. Please come to [address], on the 3rd floor to the Nuclear Medicine department for your gallium scan tomorrow. You were given tylenol and lidocaine patches to aid with your back pain. Please continue to take Lidocaine and Tylenol as an outpatient.

## 2018-08-03 NOTE — PROGRESS NOTE ADULT - PROBLEM SELECTOR PROBLEM 1
Discitis of lumbosacral region
Fever, unspecified fever cause
Sepsis, due to unspecified organism
Sepsis, due to unspecified organism

## 2018-08-03 NOTE — PROGRESS NOTE ADULT - PROBLEM SELECTOR PROBLEM 4
Back pain, unspecified back location, unspecified back pain laterality, unspecified chronicity
Hypertension, unspecified type

## 2018-08-03 NOTE — DISCHARGE NOTE ADULT - HOSPITAL COURSE
68M HTN, questionable Hep C, chronic back pain presented with fevers and acute back pain. Was treated initially with opiates and tylenol and lidocaine patch was added. Patient spiked initial fever was given fluids and Vanc/Zosyn. Antibiotics were discontinued. Patient remained afebrile for remainder of stay. Gallium scan was performed locally to exam disc and lumbar spine and showed no focal uptake. Patient will be discharged to home with low concern for active infection with plans to complete Whole body Gallium scan tomorrow.

## 2018-08-03 NOTE — DISCHARGE NOTE ADULT - MEDICATION SUMMARY - MEDICATIONS TO TAKE
I will START or STAY ON the medications listed below when I get home from the hospital:    acetaminophen 325 mg oral tablet  -- 3 tab(s) by mouth every 6 hours, As needed, Moderate Pain (4 - 6)  -- Indication: For Pain    losartan 50 mg oral tablet  -- 1 tab(s) by mouth once a day  -- Indication: For Hypertension    lidocaine 5% topical film  -- Apply on skin to affected area once a day   -- Indication: For Pain    naltrexone 50 mg oral tablet  -- 1 tab(s) by mouth once a day  -- Indication: For Hypertension

## 2018-08-03 NOTE — PROGRESS NOTE ADULT - PROBLEM SELECTOR PLAN 2
Gallium Scan today. If there are findings suggestive of discitis or vertebral osteomyelitis he will require a biopsy to obtain cultures

## 2018-08-03 NOTE — DISCHARGE NOTE ADULT - CARE PLAN
Principal Discharge DX:	Fever, unspecified fever cause  Goal:	to determine cause  Assessment and plan of treatment:	You were admitted due to concern for possible infection of the bone or spinal disc. After Gallium scan neither appeared to be infected. Due to no fever in over 24h you will be discharged home to follow up as an outpatient. If you have worsening fever, chills, nausea, vomiting please return to the ED. Please come to [address], on the 3rd floor to the Nuclear Medicine department for your gallium scan tomorrow.  Secondary Diagnosis:	Acute low back pain without sciatica, unspecified back pain laterality  Assessment and plan of treatment:	You were given tylenol and lidocaine patches to aid with your back pain. Please continue to take Lidocaine and Tylenol as an outpatient.

## 2018-08-03 NOTE — DISCHARGE NOTE ADULT - PATIENT PORTAL LINK FT
You can access the [facility], offered by [facility], by registering with the following website: [email or web address]

## 2018-08-03 NOTE — PROGRESS NOTE ADULT - ASSESSMENT
68 year old male PMH of HTN, etoh abuse on naltrexone who presents with generalized malaise and 10/10 diffuse back pain x 5days. Directly prior to admission also had episode of syncope of toilet trying to have BM followed by vasovagal prodrome.     # Sepsis  - T 100.7, WBC 22.7. 2/4 SIRS. Possible discitis? f/u gallium scan  - BCx no growth 12hrs  - ortho and Neuro rec infectious w/o.   - ID rec IR Bx    # R/O Syncope.    - syncope with vasovagal prodrome. BM on toilet, passed out, woke up diaphoretic  - cards rec echo, carotid dopper, orthostatics, hold AVN blockers    # HTN- hold off home med losartan    # Etoh abuse- on PO naltrexone, no h/o cirrhosis

## 2018-08-04 LAB
CULTURE RESULTS: NO GROWTH — SIGNIFICANT CHANGE UP
SPECIMEN SOURCE: SIGNIFICANT CHANGE UP

## 2018-08-06 LAB
CULTURE RESULTS: SIGNIFICANT CHANGE UP
CULTURE RESULTS: SIGNIFICANT CHANGE UP
SPECIMEN SOURCE: SIGNIFICANT CHANGE UP
SPECIMEN SOURCE: SIGNIFICANT CHANGE UP

## 2018-08-09 DIAGNOSIS — R50.9 FEVER, UNSPECIFIED: ICD-10-CM

## 2018-08-09 DIAGNOSIS — Z86.19 PERSONAL HISTORY OF OTHER INFECTIOUS AND PARASITIC DISEASES: ICD-10-CM

## 2018-08-09 DIAGNOSIS — R55 SYNCOPE AND COLLAPSE: ICD-10-CM

## 2018-08-09 DIAGNOSIS — J98.11 ATELECTASIS: ICD-10-CM

## 2018-08-09 DIAGNOSIS — F10.10 ALCOHOL ABUSE, UNCOMPLICATED: ICD-10-CM

## 2018-08-09 DIAGNOSIS — I10 ESSENTIAL (PRIMARY) HYPERTENSION: ICD-10-CM

## 2018-08-09 DIAGNOSIS — G89.29 OTHER CHRONIC PAIN: ICD-10-CM

## 2018-08-09 DIAGNOSIS — M54.9 DORSALGIA, UNSPECIFIED: ICD-10-CM

## 2019-02-21 NOTE — PATIENT PROFILE ADULT. - FUNCTIONAL SCREEN CURRENT LEVEL: COMMUNICATION, MLM
What do you do if you receive a shock from your ICD?    An ICD (implantable cardioverter-defibrillator) is supposed to provide a high energy shock in the event of a life-threatening arrhythmia. While the hope is that such events be infrequent occurences, an ICD shock may occur at some point. The following is the recommended course of action:    1. If you receive only one or two ICD shocks and feel well subsequently, call us () at your earliest convenience during regular business hours and we will arrange to interrogate your device either in-office or remotely.    2. If you receive more than two shocks in succession or feel dizzy or lightheaded subsequently, or have any other persistent cardiac symptoms, call 911 immediately.    
(0) understands/communicates without difficulty

## 2022-10-18 NOTE — ED ADULT NURSE NOTE - OBJECTIVE STATEMENT
no Patient presents to the ED complaining of generalized body aches and pain. Patient report that he was sitting on toilet bowl when he passed out and woke up on the floor. Denies any cough. Patient Patient presents to the ED complaining of generalized body aches and pain. Patient report that he was sitting on toilet bowl when he passed out and woke up on the floor. Denies any cough. Patient denies any recent travel.